# Patient Record
Sex: MALE | Race: WHITE | NOT HISPANIC OR LATINO | Employment: OTHER | URBAN - METROPOLITAN AREA
[De-identification: names, ages, dates, MRNs, and addresses within clinical notes are randomized per-mention and may not be internally consistent; named-entity substitution may affect disease eponyms.]

---

## 2023-03-07 DIAGNOSIS — I15.9 SECONDARY HYPERTENSION: Primary | ICD-10-CM

## 2023-03-07 DIAGNOSIS — I15.9 SECONDARY HYPERTENSION: ICD-10-CM

## 2023-03-07 DIAGNOSIS — E78.5 HYPERLIPIDEMIA, UNSPECIFIED HYPERLIPIDEMIA TYPE: ICD-10-CM

## 2023-03-07 DIAGNOSIS — E78.5 HYPERLIPIDEMIA, UNSPECIFIED HYPERLIPIDEMIA TYPE: Primary | ICD-10-CM

## 2023-03-07 RX ORDER — ATORVASTATIN CALCIUM 80 MG/1
80 TABLET, FILM COATED ORAL DAILY
Qty: 90 TABLET | Refills: 3 | Status: SHIPPED | OUTPATIENT
Start: 2023-03-07 | End: 2023-10-08 | Stop reason: SDUPTHER

## 2023-03-07 RX ORDER — METOPROLOL SUCCINATE 100 MG/1
100 TABLET, EXTENDED RELEASE ORAL DAILY
Qty: 90 TABLET | Refills: 3 | Status: SHIPPED | OUTPATIENT
Start: 2023-03-07 | End: 2023-10-12 | Stop reason: SDUPTHER

## 2023-03-07 RX ORDER — METOPROLOL SUCCINATE 100 MG/1
100 TABLET, EXTENDED RELEASE ORAL DAILY
Qty: 90 TABLET | Refills: 3 | Status: SHIPPED | OUTPATIENT
Start: 2023-03-07 | End: 2023-08-29 | Stop reason: SDUPTHER

## 2023-03-07 RX ORDER — ATORVASTATIN CALCIUM 80 MG/1
1 TABLET, FILM COATED ORAL DAILY
COMMUNITY
End: 2023-03-07 | Stop reason: SDUPTHER

## 2023-03-07 RX ORDER — METOPROLOL SUCCINATE 100 MG/1
100 TABLET, EXTENDED RELEASE ORAL DAILY
COMMUNITY
End: 2023-03-07 | Stop reason: SDUPTHER

## 2023-03-07 RX ORDER — ATORVASTATIN CALCIUM 80 MG/1
80 TABLET, FILM COATED ORAL DAILY
Qty: 90 TABLET | Refills: 3 | Status: SHIPPED | OUTPATIENT
Start: 2023-03-07 | End: 2023-10-12 | Stop reason: SDUPTHER

## 2023-06-28 DIAGNOSIS — I10 ESSENTIAL (PRIMARY) HYPERTENSION: Primary | ICD-10-CM

## 2023-06-29 RX ORDER — LISINOPRIL 20 MG/1
TABLET ORAL
Qty: 90 TABLET | Refills: 3 | Status: SHIPPED | OUTPATIENT
Start: 2023-06-29 | End: 2023-10-12 | Stop reason: SDUPTHER

## 2023-08-29 DIAGNOSIS — I15.9 SECONDARY HYPERTENSION: ICD-10-CM

## 2023-08-29 RX ORDER — METOPROLOL SUCCINATE 100 MG/1
100 TABLET, EXTENDED RELEASE ORAL DAILY
Qty: 90 TABLET | Refills: 3 | Status: SHIPPED | OUTPATIENT
Start: 2023-08-29 | End: 2023-10-08 | Stop reason: SDUPTHER

## 2023-08-30 DIAGNOSIS — E78.5 DYSLIPIDEMIA: ICD-10-CM

## 2023-08-30 PROBLEM — Z85.820 PERSONAL HISTORY OF MALIGNANT MELANOMA OF SKIN: Status: ACTIVE | Noted: 2023-04-25

## 2023-08-30 PROBLEM — I25.10 CAD (CORONARY ARTERY DISEASE): Status: ACTIVE | Noted: 2023-08-30

## 2023-08-30 PROBLEM — Z96.659 S/P TOTAL KNEE ARTHROPLASTY: Status: ACTIVE | Noted: 2023-08-30

## 2023-08-30 PROBLEM — I10 BENIGN ESSENTIAL HYPERTENSION: Status: ACTIVE | Noted: 2023-08-30

## 2023-08-30 PROBLEM — D18.01 HEMANGIOMA OF SKIN AND SUBCUTANEOUS TISSUE: Status: ACTIVE | Noted: 2023-04-25

## 2023-08-30 PROBLEM — L98.9 SKIN LESION OF BACK: Status: ACTIVE | Noted: 2023-08-30

## 2023-08-30 PROBLEM — I73.9 PAD (PERIPHERAL ARTERY DISEASE) (CMS-HCC): Status: ACTIVE | Noted: 2023-08-30

## 2023-08-30 PROBLEM — C43.59 MALIGNANT MELANOMA OF UPPER BACK (MULTI): Status: ACTIVE | Noted: 2023-08-30

## 2023-08-30 PROBLEM — D48.5 NEOPLASM OF UNCERTAIN BEHAVIOR OF SKIN: Status: ACTIVE | Noted: 2023-04-25

## 2023-08-30 PROBLEM — L82.1 OTHER SEBORRHEIC KERATOSIS: Status: ACTIVE | Noted: 2023-04-25

## 2023-08-30 PROBLEM — L57.8 OTHER SKIN CHANGES DUE TO CHRONIC EXPOSURE TO NONIONIZING RADIATION: Status: ACTIVE | Noted: 2023-04-25

## 2023-08-30 PROBLEM — D22.5 MELANOCYTIC NEVI OF TRUNK: Status: ACTIVE | Noted: 2023-04-25

## 2023-08-30 PROBLEM — R53.83 FATIGUE: Status: ACTIVE | Noted: 2023-08-30

## 2023-08-30 RX ORDER — EZETIMIBE 10 MG/1
10 TABLET ORAL NIGHTLY
Qty: 90 TABLET | Refills: 3 | Status: SHIPPED | OUTPATIENT
Start: 2023-08-30 | End: 2023-10-12 | Stop reason: SDUPTHER

## 2023-09-25 DIAGNOSIS — C43.59 MALIGNANT MELANOMA OF UPPER BACK (MULTI): Primary | ICD-10-CM

## 2023-10-08 RX ORDER — VIT C/E/ZN/COPPR/LUTEIN/ZEAXAN 250MG-90MG
1 CAPSULE ORAL DAILY
COMMUNITY
End: 2023-10-12 | Stop reason: ALTCHOICE

## 2023-10-09 ENCOUNTER — LAB (OUTPATIENT)
Dept: LAB | Facility: CLINIC | Age: 73
End: 2023-10-09
Payer: MEDICARE

## 2023-10-09 ENCOUNTER — HOSPITAL ENCOUNTER (OUTPATIENT)
Dept: RADIOLOGY | Facility: HOSPITAL | Age: 73
Discharge: HOME | End: 2023-10-09
Payer: MEDICARE

## 2023-10-09 DIAGNOSIS — C43.59 MALIGNANT MELANOMA OF OTHER PART OF TRUNK (MULTI): ICD-10-CM

## 2023-10-09 DIAGNOSIS — C43.59 MALIGNANT MELANOMA OF UPPER BACK (MULTI): ICD-10-CM

## 2023-10-09 DIAGNOSIS — C43.9 MALIGNANT MELANOMA OF SKIN, UNSPECIFIED (MULTI): ICD-10-CM

## 2023-10-09 LAB
ALBUMIN SERPL BCP-MCNC: 4.5 G/DL (ref 3.4–5)
ALP SERPL-CCNC: 77 U/L (ref 33–136)
ALT SERPL W P-5'-P-CCNC: 29 U/L (ref 10–52)
ANION GAP SERPL CALC-SCNC: 13 MMOL/L (ref 10–20)
AST SERPL W P-5'-P-CCNC: 28 U/L (ref 9–39)
BASOPHILS # BLD AUTO: 0.04 X10*3/UL (ref 0–0.1)
BASOPHILS NFR BLD AUTO: 0.4 %
BILIRUB SERPL-MCNC: 0.8 MG/DL (ref 0–1.2)
BUN SERPL-MCNC: 15 MG/DL (ref 6–23)
CALCIUM SERPL-MCNC: 9.6 MG/DL (ref 8.6–10.3)
CHLORIDE SERPL-SCNC: 101 MMOL/L (ref 98–107)
CO2 SERPL-SCNC: 26 MMOL/L (ref 21–32)
CORTIS AM PEAK SERPL-MSCNC: 11.8 UG/DL (ref 5–20)
CREAT SERPL-MCNC: 0.9 MG/DL (ref 0.5–1.3)
EOSINOPHIL # BLD AUTO: 0.01 X10*3/UL (ref 0–0.4)
EOSINOPHIL NFR BLD AUTO: 0.1 %
ERYTHROCYTE [DISTWIDTH] IN BLOOD BY AUTOMATED COUNT: 13.2 % (ref 11.5–14.5)
GFR SERPL CREATININE-BSD FRML MDRD: 89 ML/MIN/1.73M*2
GLUCOSE SERPL-MCNC: 91 MG/DL (ref 74–99)
HCT VFR BLD AUTO: 48.3 % (ref 41–52)
HGB BLD-MCNC: 16.4 G/DL (ref 13.5–17.5)
IMM GRANULOCYTES # BLD AUTO: 0.01 X10*3/UL (ref 0–0.5)
IMM GRANULOCYTES NFR BLD AUTO: 0.1 % (ref 0–0.9)
LYMPHOCYTES # BLD AUTO: 1.36 X10*3/UL (ref 0.8–3)
LYMPHOCYTES NFR BLD AUTO: 12.8 %
MCH RBC QN AUTO: 33.3 PG (ref 26–34)
MCHC RBC AUTO-ENTMCNC: 34 G/DL (ref 32–36)
MCV RBC AUTO: 98 FL (ref 80–100)
MONOCYTES # BLD AUTO: 1.01 X10*3/UL (ref 0.05–0.8)
MONOCYTES NFR BLD AUTO: 9.5 %
NEUTROPHILS # BLD AUTO: 8.17 X10*3/UL (ref 1.6–5.5)
NEUTROPHILS NFR BLD AUTO: 77.1 %
PLATELET # BLD AUTO: 151 X10*3/UL (ref 150–450)
PMV BLD AUTO: 11.4 FL (ref 7.5–11.5)
POTASSIUM SERPL-SCNC: 4.9 MMOL/L (ref 3.5–5.3)
PROT SERPL-MCNC: 6.8 G/DL (ref 6.4–8.2)
RBC # BLD AUTO: 4.92 X10*6/UL (ref 4.5–5.9)
SODIUM SERPL-SCNC: 135 MMOL/L (ref 136–145)
TSH SERPL-ACNC: 1.96 MIU/L (ref 0.44–3.98)
WBC # BLD AUTO: 10.6 X10*3/UL (ref 4.4–11.3)

## 2023-10-09 PROCEDURE — 84075 ASSAY ALKALINE PHOSPHATASE: CPT

## 2023-10-09 PROCEDURE — 82024 ASSAY OF ACTH: CPT

## 2023-10-09 PROCEDURE — 82533 TOTAL CORTISOL: CPT

## 2023-10-09 PROCEDURE — 84443 ASSAY THYROID STIM HORMONE: CPT

## 2023-10-09 PROCEDURE — 74177 CT ABD & PELVIS W/CONTRAST: CPT

## 2023-10-09 PROCEDURE — 74177 CT ABD & PELVIS W/CONTRAST: CPT | Performed by: SURGERY

## 2023-10-09 PROCEDURE — 2550000001 HC RX 255 CONTRASTS: Performed by: INTERNAL MEDICINE

## 2023-10-09 PROCEDURE — 71260 CT THORAX DX C+: CPT | Performed by: SURGERY

## 2023-10-09 PROCEDURE — 85025 COMPLETE CBC W/AUTO DIFF WBC: CPT

## 2023-10-09 PROCEDURE — 36415 COLL VENOUS BLD VENIPUNCTURE: CPT

## 2023-10-09 PROCEDURE — 82040 ASSAY OF SERUM ALBUMIN: CPT

## 2023-10-09 PROCEDURE — 71260 CT THORAX DX C+: CPT

## 2023-10-09 RX ADMIN — IOHEXOL 75 ML: 350 INJECTION, SOLUTION INTRAVENOUS at 15:32

## 2023-10-11 ENCOUNTER — TELEPHONE (OUTPATIENT)
Dept: ADMISSION | Facility: HOSPITAL | Age: 73
End: 2023-10-11
Payer: MEDICARE

## 2023-10-11 LAB — ACTH PLAS-MCNC: 7.2 PG/ML (ref 7.2–63.3)

## 2023-10-12 ENCOUNTER — INFUSION (OUTPATIENT)
Dept: HEMATOLOGY/ONCOLOGY | Facility: CLINIC | Age: 73
End: 2023-10-12
Payer: MEDICARE

## 2023-10-12 ENCOUNTER — OFFICE VISIT (OUTPATIENT)
Dept: PRIMARY CARE | Facility: CLINIC | Age: 73
End: 2023-10-12
Payer: MEDICARE

## 2023-10-12 ENCOUNTER — OFFICE VISIT (OUTPATIENT)
Dept: HEMATOLOGY/ONCOLOGY | Facility: CLINIC | Age: 73
End: 2023-10-12
Payer: MEDICARE

## 2023-10-12 VITALS
RESPIRATION RATE: 16 BRPM | DIASTOLIC BLOOD PRESSURE: 87 MMHG | OXYGEN SATURATION: 94 % | WEIGHT: 145.28 LBS | BODY MASS INDEX: 24.21 KG/M2 | HEART RATE: 82 BPM | HEIGHT: 65 IN | TEMPERATURE: 97.7 F | SYSTOLIC BLOOD PRESSURE: 145 MMHG

## 2023-10-12 VITALS
SYSTOLIC BLOOD PRESSURE: 130 MMHG | RESPIRATION RATE: 14 BRPM | WEIGHT: 153 LBS | TEMPERATURE: 96.7 F | HEIGHT: 65 IN | BODY MASS INDEX: 25.49 KG/M2 | HEART RATE: 78 BPM | OXYGEN SATURATION: 98 % | DIASTOLIC BLOOD PRESSURE: 70 MMHG

## 2023-10-12 DIAGNOSIS — Z12.5 SCREENING FOR PROSTATE CANCER: ICD-10-CM

## 2023-10-12 DIAGNOSIS — G89.29 CHRONIC PAIN OF LEFT KNEE: Primary | ICD-10-CM

## 2023-10-12 DIAGNOSIS — R52 PAIN: ICD-10-CM

## 2023-10-12 DIAGNOSIS — C43.59 MALIGNANT MELANOMA OF UPPER BACK (MULTI): Primary | ICD-10-CM

## 2023-10-12 DIAGNOSIS — E78.5 DYSLIPIDEMIA: ICD-10-CM

## 2023-10-12 DIAGNOSIS — T73.2XXD FATIGUE DUE TO EXPOSURE, SUBSEQUENT ENCOUNTER: ICD-10-CM

## 2023-10-12 DIAGNOSIS — I73.9 PAD (PERIPHERAL ARTERY DISEASE) (CMS-HCC): ICD-10-CM

## 2023-10-12 DIAGNOSIS — C77.9 MELANOMA METASTATIC TO LYMPH NODE (MULTI): ICD-10-CM

## 2023-10-12 DIAGNOSIS — I10 ESSENTIAL (PRIMARY) HYPERTENSION: ICD-10-CM

## 2023-10-12 DIAGNOSIS — E78.5 HYPERLIPIDEMIA, UNSPECIFIED HYPERLIPIDEMIA TYPE: ICD-10-CM

## 2023-10-12 DIAGNOSIS — M25.562 CHRONIC PAIN OF LEFT KNEE: Primary | ICD-10-CM

## 2023-10-12 DIAGNOSIS — Z51.12 ENCOUNTER FOR ANTINEOPLASTIC IMMUNOTHERAPY: ICD-10-CM

## 2023-10-12 DIAGNOSIS — C43.59 MALIGNANT MELANOMA OF UPPER BACK (MULTI): ICD-10-CM

## 2023-10-12 PROCEDURE — 3075F SYST BP GE 130 - 139MM HG: CPT | Performed by: INTERNAL MEDICINE

## 2023-10-12 PROCEDURE — 99213 OFFICE O/P EST LOW 20 MIN: CPT | Performed by: INTERNAL MEDICINE

## 2023-10-12 PROCEDURE — 3079F DIAST BP 80-89 MM HG: CPT | Performed by: INTERNAL MEDICINE

## 2023-10-12 PROCEDURE — 99215 OFFICE O/P EST HI 40 MIN: CPT | Performed by: INTERNAL MEDICINE

## 2023-10-12 PROCEDURE — 3078F DIAST BP <80 MM HG: CPT | Performed by: INTERNAL MEDICINE

## 2023-10-12 PROCEDURE — 96413 CHEMO IV INFUSION 1 HR: CPT

## 2023-10-12 PROCEDURE — 99215 OFFICE O/P EST HI 40 MIN: CPT | Mod: 25 | Performed by: INTERNAL MEDICINE

## 2023-10-12 PROCEDURE — 1159F MED LIST DOCD IN RCRD: CPT | Performed by: INTERNAL MEDICINE

## 2023-10-12 PROCEDURE — 2500000004 HC RX 250 GENERAL PHARMACY W/ HCPCS (ALT 636 FOR OP/ED): Performed by: INTERNAL MEDICINE

## 2023-10-12 PROCEDURE — 1160F RVW MEDS BY RX/DR IN RCRD: CPT | Performed by: INTERNAL MEDICINE

## 2023-10-12 PROCEDURE — 3077F SYST BP >= 140 MM HG: CPT | Performed by: INTERNAL MEDICINE

## 2023-10-12 RX ORDER — METOPROLOL SUCCINATE 100 MG/1
100 TABLET, EXTENDED RELEASE ORAL DAILY
Qty: 90 TABLET | Refills: 3 | Status: SHIPPED | OUTPATIENT
Start: 2023-10-12 | End: 2024-10-11

## 2023-10-12 RX ORDER — HEPARIN SODIUM,PORCINE/PF 10 UNIT/ML
50 SYRINGE (ML) INTRAVENOUS AS NEEDED
Status: CANCELLED | OUTPATIENT
Start: 2023-10-12

## 2023-10-12 RX ORDER — FAMOTIDINE 10 MG/ML
20 INJECTION INTRAVENOUS ONCE AS NEEDED
Status: CANCELLED | OUTPATIENT
Start: 2023-10-12

## 2023-10-12 RX ORDER — DIPHENHYDRAMINE HYDROCHLORIDE 50 MG/ML
50 INJECTION INTRAMUSCULAR; INTRAVENOUS AS NEEDED
Status: CANCELLED | OUTPATIENT
Start: 2023-10-12

## 2023-10-12 RX ORDER — PROCHLORPERAZINE MALEATE 5 MG
10 TABLET ORAL EVERY 6 HOURS PRN
Status: CANCELLED | OUTPATIENT
Start: 2023-10-12

## 2023-10-12 RX ORDER — METHYLPREDNISOLONE SODIUM SUCCINATE 40 MG/ML
40 INJECTION INTRAMUSCULAR; INTRAVENOUS AS NEEDED
Status: CANCELLED | OUTPATIENT
Start: 2023-10-12

## 2023-10-12 RX ORDER — HEPARIN 100 UNIT/ML
500 SYRINGE INTRAVENOUS AS NEEDED
Status: CANCELLED | OUTPATIENT
Start: 2023-10-12

## 2023-10-12 RX ORDER — DIPHENHYDRAMINE HYDROCHLORIDE 50 MG/ML
50 INJECTION INTRAMUSCULAR; INTRAVENOUS AS NEEDED
Status: DISCONTINUED | OUTPATIENT
Start: 2023-10-12 | End: 2023-10-12 | Stop reason: HOSPADM

## 2023-10-12 RX ORDER — ALBUTEROL SULFATE 0.83 MG/ML
3 SOLUTION RESPIRATORY (INHALATION) AS NEEDED
Status: CANCELLED | OUTPATIENT
Start: 2023-10-12

## 2023-10-12 RX ORDER — ATORVASTATIN CALCIUM 80 MG/1
80 TABLET, FILM COATED ORAL DAILY
Qty: 90 TABLET | Refills: 3 | Status: SHIPPED | OUTPATIENT
Start: 2023-10-12 | End: 2024-10-11

## 2023-10-12 RX ORDER — LISINOPRIL 20 MG/1
20 TABLET ORAL DAILY
Qty: 90 TABLET | Refills: 3 | Status: SHIPPED | OUTPATIENT
Start: 2023-10-12 | End: 2024-10-11

## 2023-10-12 RX ORDER — EPINEPHRINE 0.3 MG/.3ML
0.3 INJECTION SUBCUTANEOUS EVERY 5 MIN PRN
Status: CANCELLED | OUTPATIENT
Start: 2023-10-12

## 2023-10-12 RX ORDER — PROCHLORPERAZINE EDISYLATE 5 MG/ML
10 INJECTION INTRAMUSCULAR; INTRAVENOUS EVERY 6 HOURS PRN
Status: CANCELLED | OUTPATIENT
Start: 2023-10-12

## 2023-10-12 RX ORDER — FAMOTIDINE 10 MG/ML
20 INJECTION INTRAVENOUS ONCE AS NEEDED
Status: DISCONTINUED | OUTPATIENT
Start: 2023-10-12 | End: 2023-10-12 | Stop reason: HOSPADM

## 2023-10-12 RX ORDER — EZETIMIBE 10 MG/1
10 TABLET ORAL NIGHTLY
Qty: 90 TABLET | Refills: 3 | Status: SHIPPED | OUTPATIENT
Start: 2023-10-12

## 2023-10-12 RX ORDER — ALBUTEROL SULFATE 0.83 MG/ML
3 SOLUTION RESPIRATORY (INHALATION) AS NEEDED
Status: DISCONTINUED | OUTPATIENT
Start: 2023-10-12 | End: 2023-10-12 | Stop reason: HOSPADM

## 2023-10-12 RX ORDER — EPINEPHRINE 0.3 MG/.3ML
0.3 INJECTION SUBCUTANEOUS EVERY 5 MIN PRN
Status: DISCONTINUED | OUTPATIENT
Start: 2023-10-12 | End: 2023-10-12 | Stop reason: HOSPADM

## 2023-10-12 RX ADMIN — SODIUM CHLORIDE 400 MG: 9 INJECTION, SOLUTION INTRAVENOUS at 10:55

## 2023-10-12 ASSESSMENT — ENCOUNTER SYMPTOMS
VOMITING: 0
NERVOUS/ANXIOUS: 0
ARTHRALGIAS: 1
SCLERAL ICTERUS: 0
SHORTNESS OF BREATH: 0
OCCASIONAL FEELINGS OF UNSTEADINESS: 0
SORE THROAT: 0
VOICE CHANGE: 0
NECK PAIN: 0
EYE PROBLEMS: 0
FLANK PAIN: 0
LOSS OF SENSATION IN FEET: 0
BACK PAIN: 0
CONSTIPATION: 0
FEVER: 0
SEIZURES: 0
DEPRESSION: 0
DIZZINESS: 0
LIGHT-HEADEDNESS: 0
ADENOPATHY: 0
DIARRHEA: 0
BLOOD IN STOOL: 0
DYSURIA: 0
SHORTNESS OF BREATH: 0
CHILLS: 0
EXTREMITY WEAKNESS: 0
NAUSEA: 0
WHEEZING: 0
ABDOMINAL PAIN: 0
BRUISES/BLEEDS EASILY: 0
TROUBLE SWALLOWING: 0
CONFUSION: 0
NUMBNESS: 0
COUGH: 0
FATIGUE: 0
FREQUENCY: 0
MYALGIAS: 0
HEMATURIA: 0
CONSTIPATION: 0
DIARRHEA: 0
CHEST TIGHTNESS: 0
ARTHRALGIAS: 1
HEMOPTYSIS: 0
PALPITATIONS: 0
HOT FLASHES: 0
DIFFICULTY URINATING: 0
PALPITATIONS: 0
APPETITE CHANGE: 0
LEG SWELLING: 0

## 2023-10-12 NOTE — PROGRESS NOTES
".Patient ID: Gabriel Cowan is a 73 y.o. male.  Referring Physician: No referring provider defined for this encounter.  Primary Care Provider: Montana Hernandez DO     Chief Complaint   Patient presents with    Follow-up    Melanoma     Follow up imaging results and immunotherapy.         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 He met with our team on 4/6/23.      Referring Surgeon: Dr. Kp Kilgore  Dermatologist: Yin Cano DO   Date of first meeting with our team: 04/06/2023     Date of First meeting with surgical team: 02/14/2023  Melanoma type: Cutaneous- Superficial Spreading type  Location of primary site: Left Upper back  Date of WLE and SLNB: 3/6/23  Final pathology: Breslow Depth- 1.6 mm; Ulceration status- Negative; LVI- No; Other high risk features- Microsatelitosis present.  Heppner lymph node status: 2/2 positive. Largest deposit 9mm. No JUAN.  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 WLE and SLNB completed on 3/6/23. He was staged at IIIC melanoma. He started keytruda on 4/27/2023 at 400mg IV dose.        Treatment History:   Systemic therapy.   1. Keytruda 400mg (adjuvant setting)  C1- 04/27/2023  C2- 06/08/2023  C3- 07/20/2023  C4- 08/31/2023  C5- 10/12/2023      Subjective   Please refer to \"Notes/Cancer History\" above for complete History of present illness.     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 He has left knee pain which he experienced suddenly during one of his walks. No fevers. No small joint involvement.      Review of Systems:   Review of Systems   Constitutional:  Negative for appetite change, chills, fatigue and fever.   HENT:   Negative for hearing loss, mouth sores, sore throat, trouble swallowing and voice " change.    Eyes:  Negative for eye problems and icterus.   Respiratory:  Negative for chest tightness, hemoptysis and shortness of breath.    Cardiovascular:  Negative for chest pain, leg swelling and palpitations.   Gastrointestinal:  Negative for abdominal pain, blood in stool, constipation, diarrhea, nausea and vomiting.   Endocrine: Negative for hot flashes.   Genitourinary:  Negative for difficulty urinating, dysuria, frequency, hematuria, nocturia and pelvic pain.    Musculoskeletal:  Positive for arthralgias (left knee pain - likely traumatic). Negative for back pain, flank pain, gait problem, myalgias and neck pain.   Skin:  Negative for itching and rash.   Neurological:  Negative for dizziness, extremity weakness, gait problem, light-headedness, numbness and seizures.   Hematological:  Negative for adenopathy. Does not bruise/bleed easily.   Psychiatric/Behavioral:  Negative for confusion and depression. The patient is not nervous/anxious.          MEDICAL HISTORY  Past Medical History:   Diagnosis Date    CAD (coronary artery disease)     HTN (hypertension)     Malignant melanoma of skin of back (CMS/HCC)     Melanoma metastatic to lymph node (CMS/HCC)        FAMILY HISTORY  Family History   Problem Relation Name Age of Onset    No Known Problems Mother      No Known Problems Father      No Known Problems Sister      No Known Problems Brother         TOBACCO HISTORY  Tobacco Use: High Risk (10/12/2023)    Patient History     Smoking Tobacco Use: Some Days     Smokeless Tobacco Use: Never     Passive Exposure: Not on file       SOCIAL HISTORY  Social Connections: Not on file        Outpatient Medication Profile:  Current Outpatient Medications on File Prior to Visit   Medication Sig Dispense Refill    aspirin-calcium carbonate 81 mg-300 mg calcium(777 mg) tablet Take 1 tablet by mouth once daily. 90 each 3    atorvastatin (Lipitor) 80 mg tablet Take 1 tablet (80 mg) by mouth once daily. 90 tablet 3     "cholecalciferol (Vitamin D-3) 25 MCG (1000 UT) capsule Take 1 capsule (25 mcg) by mouth once daily.      ezetimibe (Zetia) 10 mg tablet TAKE 1 TABLET BY MOUTH AT  BEDTIME 90 tablet 3    lisinopril 20 mg tablet TAKE 1 TABLET BY MOUTH  DAILY 90 tablet 3    metoprolol succinate XL (Toprol-XL) 100 mg 24 hr tablet Take 1 tablet (100 mg) by mouth once daily. Do not crush or chew. 90 tablet 3    [DISCONTINUED] aspirin-calcium carbonate 81 mg-300 mg calcium(777 mg) tablet Take 1 tablet by mouth once daily. 90 each 3    [DISCONTINUED] atorvastatin (Lipitor) 80 mg tablet Take 1 tablet (80 mg) by mouth once daily. 90 tablet 3    [DISCONTINUED] metoprolol succinate XL (Toprol-XL) 100 mg 24 hr tablet Take 1 tablet (100 mg) by mouth once daily. Do not crush or chew. 90 tablet 3     No current facility-administered medications on file prior to visit.         Performance Status:  Asymptomatic     Vitals and Measurements:   /87 (BP Location: Left arm, Patient Position: Sitting)   Pulse 82   Temp 36.5 °C (97.7 °F) (Temporal)   Resp 16   Ht (S) 1.643 m (5' 4.69\") Comment: height verification  Wt 65.9 kg (145 lb 4.5 oz)   SpO2 94%   BMI 24.41 kg/m²       Physical Exam:   Physical Exam  Constitutional:       General: He is not in acute distress.     Appearance: Normal appearance. He is not ill-appearing.   HENT:      Head: Normocephalic and atraumatic.   Eyes:      Extraocular Movements: Extraocular movements intact.      Conjunctiva/sclera: Conjunctivae normal.      Pupils: Pupils are equal, round, and reactive to light.   Cardiovascular:      Rate and Rhythm: Normal rate and regular rhythm.      Pulses: Normal pulses.   Pulmonary:      Effort: Pulmonary effort is normal.      Breath sounds: No wheezing, rhonchi or rales.   Abdominal:      General: Abdomen is flat. Bowel sounds are normal.      Palpations: There is no mass.      Tenderness: There is no abdominal tenderness. There is no rebound.   Musculoskeletal:         " General: Swelling (left knee) present. Normal range of motion.      Cervical back: Neck supple.   Skin:     General: Skin is warm.   Neurological:      General: No focal deficit present.      Mental Status: He is alert and oriented to person, place, and time.   Psychiatric:         Mood and Affect: Mood normal.         Behavior: Behavior normal.         Thought Content: Thought content normal.         Judgment: Judgment normal.              Lab Results:  I have reviewed these laboratory results:     Lab Results   Component Value Date    WBC 10.6 10/09/2023    HGB 16.4 10/09/2023    HCT 48.3 10/09/2023    MCV 98 10/09/2023     10/09/2023         Chemistry    Lab Results   Component Value Date/Time     (L) 10/09/2023 1526    K 4.9 10/09/2023 1526     10/09/2023 1526    CO2 26 10/09/2023 1526    BUN 15 10/09/2023 1526    CREATININE 0.90 10/09/2023 1526    Lab Results   Component Value Date/Time    CALCIUM 9.6 10/09/2023 1526    ALKPHOS 77 10/09/2023 1526    AST 28 10/09/2023 1526    ALT 29 10/09/2023 1526    BILITOT 0.8 10/09/2023 1526            Lab Results   Component Value Date    TSH 1.96 10/09/2023        Radiology Result:  I have reviewed the latest Imaging in PACS and the findings are noted in this note. I discussed the results of the latest imaging with the patient. All previous imaging were reviewed at the time it was completed. Full records are available in the EMR for review as well.     === 10/09/23 ===    CT CHEST ABDOMEN PELVIS W IV CONTRAST    - Impression -  No evidence of acute abnormality or metastatic disease in the chest,  abdomen or pelvis.    Detailed discussion of incidental findings as above.      MACRO:  None    Signed by: Jairo Jennings 10/11/2023 1:08 PM  Dictation workstation:   [id number]    === 04/04/23 ===    MR BRAIN W AND WO CONTRAST    - Impression -  No evidence of intracranial metastatic disease.    Focal hyperintense signal with enhancement within the right  frontal  calvarium may represent small hemangiomas versus a prominent venous  [place]. Consider noncontrast CT of the head to further characterize  and attention on follow-up exams.    Mild degree of nonspecific white matter signal compatible with  microangiopathy.    CT chest abdomen pelvis w IV contrast    Result Date: 10/11/2023  Impression: No evidence of acute abnormality or metastatic disease in the chest, abdomen or pelvis.   Detailed discussion of incidental findings as above.     MACRO: None   Signed by: Jairo Jennings 10/11/2023 1:08 PM Dictation workstation:   [id number]        Pathology Results:  I have reviewed the full pathology report recorded in the EMR. The pertinent portions indicating diagnosis are listed here in the note. for details please refer to the full report recorded in the EMR.    Dermatopathology [Jan 26 2023 4:25PM] ([id number])    Specimens: SKIN, L LATERAL BACK     Name JUNIE ROSA     Accession  #: [id number]   Pathologist: KENY LOPEZ MD   Date ofProcedure: 1/24/2023   Date Received: 1/25/2023   Date Reported 1/26/2023   Submitting Physician: BRANDO MOORE DO   Location: [facility] Other External #     FINAL  DIAGNOSIS   SKIN, L LATERAL BACK, SHAVE BIOPSY:   INVASIVE MALIGNANT MELANOMA, EXTENDING TO A DEPTH OF APPROXIMATELY 1.6 MM (SEE COMMENT):     Comment: The broad shave specimen is sectioned into five portions and reveals a broad severely  atypical compound melanocytic proliferation with areas of tumor regression. The invasive component extends focally to the deep margin at a peripheral margin. See synoptic summary below.     Electronically Signed Out by KENY LOPEZ MD.     Addendum Diagnosis   TEST: BRAF Exon 15 Sequencing   SPECIMEN: FFPE, SKIN LEFT UPPER BACK, [id number] C10   DISEASE DIAGNOSIS: Melanoma   Estimated Tumor Content: 40%   COLLECTION  DATE: 3/6/2023   RECEIVED DATE: 3/20/2023   REPORT DATE: 03/24/2023    RESULT:   BRAF p.V600E ([id number])       INTERPRETATION AND POTENTIAL THERAPEUTIC OPTIONS:   BRAF p.V600E   BRAF V600E mutations are associated with sensitivity to BRAF and MEK   inhibitors.      Assessment and Plan:   Assessment/Plan      Mr. Gabriel Cowan is a 73 y.o. male with a diagnosis of melanoma IIIC. BRAF positive. Please see the evolution of the case listed above in the cancer history.     Today,   Mr. Cowan continues to do well. No new issues reported this visit. CT scan is unremarkable. Continue with therapy and surveillance.      # Melanoma IIIC  - Continue Keytruda at 400mg IV. Cycle 5 today.   - RTC in 6 weeks on 11/30/2023.  - Labs on 11/27/23.  - Continue bobby basin u/s with surgical oncology. Next 10/17/2023.  - Continue skin checks with dermatology      # CAD/HTN  - Follows with cardiology. Next visit 11/07/2023.       DISCLAIMER:   In preparing for this visit and writing this note, I reviewed all the previous electronic medical records (labs, imaging and medical charts) of the patient available in the physician portal. Significant findings which helped in decision making are recorded  in this chart.     The plan was discussed with the patient. We gave him ample opportunities to ask questions. All questions were answered to his satisfaction and he verbalized understanding.       Christopher Iglesias MD    Hematology and Oncology     Phone: [phone]  Fax: [phone].

## 2023-10-12 NOTE — PROGRESS NOTES
"Subjective   Patient ID: Gabriel Cowan is a 73 y.o. male who presents for Knee Pain (Knee pain left sided.).    Knee Pain     Left knee pain.  No acute injury, but he does admit he remembers twisting it slightly.  Discomfort along the lateral and inferior border of his left knee.  No swelling.  No bony pain.       Review of Systems   Respiratory:  Negative for cough, shortness of breath and wheezing.    Cardiovascular:  Negative for chest pain and palpitations.   Gastrointestinal:  Negative for constipation and diarrhea.   Musculoskeletal:  Positive for arthralgias.         Left knee pain       Objective   /70 (BP Location: Left arm, Patient Position: Sitting, BP Cuff Size: Adult)   Pulse 78   Temp 35.9 °C (96.7 °F) (Tympanic)   Resp 14   Ht 1.648 m (5' 4.9\")   Wt 69.4 kg (153 lb)   SpO2 98%   BMI 25.54 kg/m²     Physical Exam  Vitals reviewed.   Constitutional:       Appearance: Normal appearance.   HENT:      Head: Normocephalic.   Cardiovascular:      Rate and Rhythm: Normal rate.   Pulmonary:      Effort: Pulmonary effort is normal.   Musculoskeletal:         General: Tenderness present. No swelling. Normal range of motion.      Comments: Some mild tenderness lateral compartment of left knee.  Full ROM.  No joint effusion.  + antalgic gait.    Neurological:      General: No focal deficit present.      Mental Status: He is alert.   Psychiatric:         Mood and Affect: Mood normal.         Assessment/Plan   Problem List Items Addressed This Visit             ICD-10-CM    Dyslipidemia E78.5    Relevant Medications    ezetimibe (Zetia) 10 mg tablet    PAD (peripheral artery disease) (CMS/HCC) I73.9     Other Visit Diagnoses         Codes    Chronic pain of left knee    -  Primary M25.562, G89.29    Relevant Orders    Referral to Physical Therapy    Hyperlipidemia, unspecified hyperlipidemia type     E78.5    Relevant Medications    atorvastatin (Lipitor) 80 mg tablet    metoprolol succinate XL " (Toprol-XL) 100 mg 24 hr tablet    Other Relevant Orders    Lipid Panel    Essential (primary) hypertension     I10    Relevant Medications    lisinopril 20 mg tablet    Screening for prostate cancer     Z12.5    Relevant Orders    Prostate Specific Antigen    Pain     R52    Relevant Orders    XR knee left 3 views        Discussed all of the above.    For his left knee pain we will get x-ray and proceed with PT.  We also discussed using OTC Voltaren gel to help with inflammation and pain.  If symptoms persist he may need to see ortho.  We discussed his risk factors as well.    Follow up in a few months - sooner if any issues.

## 2023-10-12 NOTE — PATIENT INSTRUCTIONS
Mr. Gabriel Cowan ,  It was a pleasure talking to you today.    As discussed, this is the plan for you.   Next Keytruda is on 11/30/23  Labs on 11/28/23  Visit with us on 11/30/23 for pre-treatment check    Our offices will be reaching out to you to make all the appointments. I am always available at the phone numbers listed below for an earlier appointment should you wish one.    In case of an emergency please dial 911 or report to your nearest Emergency Room.  For all other questions, please do not hesitate to reach out to us at the number listed below.    Thank you for choosing [facility] at [facility].   We appreciate your visit.     MD Brisa Landeros, DEMETRIO Mccormick RN    Sarcoma and Cutaneous Oncology team    Phone: [phone]  Fax: [phone].

## 2023-10-12 NOTE — SIGNIFICANT EVENT
10/12/23 1021   Prechemo Checklist   Has the patient been in the hospital, ED, or urgent care since last date of service No   Chemo/Immuno Consent Signed Yes   Protocol/Indications Verified Yes   Confirmed to previous date/time of medication Yes   Compared to previous dose Yes   All medications are dated accurately Yes   Pregnancy Test Negative Not applicable   Parameters Met Yes   BSA/Weight-Height Verified Yes   Dose Calculations Verified Yes

## 2023-10-17 ENCOUNTER — HOSPITAL ENCOUNTER (OUTPATIENT)
Dept: RADIOLOGY | Facility: HOSPITAL | Age: 73
Discharge: HOME | End: 2023-10-17
Payer: MEDICARE

## 2023-10-17 DIAGNOSIS — C43.59 MALIGNANT MELANOMA OF OTHER PART OF TRUNK (MULTI): ICD-10-CM

## 2023-10-17 PROCEDURE — 76882 US LMTD JT/FCL EVL NVASC XTR: CPT | Performed by: RADIOLOGY

## 2023-10-17 PROCEDURE — 76882 US LMTD JT/FCL EVL NVASC XTR: CPT

## 2023-10-19 DIAGNOSIS — C43.59 MALIGNANT MELANOMA OF UPPER BACK (MULTI): Primary | ICD-10-CM

## 2023-10-23 ENCOUNTER — TELEPHONE (OUTPATIENT)
Dept: ADMISSION | Facility: HOSPITAL | Age: 73
End: 2023-10-23
Payer: MEDICARE

## 2023-10-24 ENCOUNTER — OFFICE VISIT (OUTPATIENT)
Dept: PRIMARY CARE | Facility: CLINIC | Age: 73
End: 2023-10-24
Payer: MEDICARE

## 2023-10-24 ENCOUNTER — ANCILLARY PROCEDURE (OUTPATIENT)
Dept: RADIOLOGY | Facility: CLINIC | Age: 73
End: 2023-10-24
Payer: MEDICARE

## 2023-10-24 VITALS
OXYGEN SATURATION: 98 % | DIASTOLIC BLOOD PRESSURE: 80 MMHG | BODY MASS INDEX: 25.33 KG/M2 | WEIGHT: 152 LBS | HEIGHT: 65 IN | HEART RATE: 68 BPM | SYSTOLIC BLOOD PRESSURE: 130 MMHG | RESPIRATION RATE: 14 BRPM | TEMPERATURE: 97 F

## 2023-10-24 DIAGNOSIS — E78.5 DYSLIPIDEMIA: ICD-10-CM

## 2023-10-24 DIAGNOSIS — I25.10 CORONARY ARTERY DISEASE INVOLVING NATIVE CORONARY ARTERY OF NATIVE HEART WITHOUT ANGINA PECTORIS: ICD-10-CM

## 2023-10-24 DIAGNOSIS — Z00.00 WELLNESS EXAMINATION: ICD-10-CM

## 2023-10-24 DIAGNOSIS — I73.9 PAD (PERIPHERAL ARTERY DISEASE) (CMS-HCC): Primary | ICD-10-CM

## 2023-10-24 DIAGNOSIS — R52 PAIN: ICD-10-CM

## 2023-10-24 DIAGNOSIS — I10 BENIGN ESSENTIAL HYPERTENSION: ICD-10-CM

## 2023-10-24 DIAGNOSIS — Z00.00 PERIODIC HEALTH ASSESSMENT, GENERAL SCREENING, ADULT: ICD-10-CM

## 2023-10-24 PROCEDURE — 73562 X-RAY EXAM OF KNEE 3: CPT | Mod: LEFT SIDE | Performed by: RADIOLOGY

## 2023-10-24 PROCEDURE — G0439 PPPS, SUBSEQ VISIT: HCPCS | Performed by: INTERNAL MEDICINE

## 2023-10-24 PROCEDURE — 1160F RVW MEDS BY RX/DR IN RCRD: CPT | Performed by: INTERNAL MEDICINE

## 2023-10-24 PROCEDURE — 1159F MED LIST DOCD IN RCRD: CPT | Performed by: INTERNAL MEDICINE

## 2023-10-24 PROCEDURE — 99397 PER PM REEVAL EST PAT 65+ YR: CPT | Performed by: INTERNAL MEDICINE

## 2023-10-24 PROCEDURE — G0008 ADMIN INFLUENZA VIRUS VAC: HCPCS | Performed by: INTERNAL MEDICINE

## 2023-10-24 PROCEDURE — 3075F SYST BP GE 130 - 139MM HG: CPT | Performed by: INTERNAL MEDICINE

## 2023-10-24 PROCEDURE — 90662 IIV NO PRSV INCREASED AG IM: CPT | Performed by: INTERNAL MEDICINE

## 2023-10-24 PROCEDURE — 1170F FXNL STATUS ASSESSED: CPT | Performed by: INTERNAL MEDICINE

## 2023-10-24 PROCEDURE — 3079F DIAST BP 80-89 MM HG: CPT | Performed by: INTERNAL MEDICINE

## 2023-10-24 PROCEDURE — 73562 X-RAY EXAM OF KNEE 3: CPT | Mod: LT

## 2023-10-24 ASSESSMENT — PATIENT HEALTH QUESTIONNAIRE - PHQ9
1. LITTLE INTEREST OR PLEASURE IN DOING THINGS: NOT AT ALL
2. FEELING DOWN, DEPRESSED OR HOPELESS: NOT AT ALL
SUM OF ALL RESPONSES TO PHQ9 QUESTIONS 1 AND 2: 0

## 2023-10-24 ASSESSMENT — ENCOUNTER SYMPTOMS
PALPITATIONS: 0
SHORTNESS OF BREATH: 0
RHINORRHEA: 1
ABDOMINAL PAIN: 0
WHEEZING: 0
COUGH: 1

## 2023-10-24 ASSESSMENT — ACTIVITIES OF DAILY LIVING (ADL)
DOING_HOUSEWORK: INDEPENDENT
BATHING: INDEPENDENT
GROCERY_SHOPPING: INDEPENDENT
MANAGING_FINANCES: INDEPENDENT
TAKING_MEDICATION: INDEPENDENT
DRESSING: INDEPENDENT

## 2023-10-24 NOTE — PROGRESS NOTES
" Subjective   Patient ID: Gabriel Cowan is a 73 y.o. male who presents for [facility] and  Medicare Annual Wellness Visit Subsequent.    Overall doing well.  Recent URI with mild symptoms.  No F/C/S.  No SOB.  Patient is fairly active, although limited recently due to knee pain.  Denies any issues with CP,SOB or dizzy spells.  No issues with anxiety, depression or sleep related problems. Denies any issues with HA, numbness or tingling.  No issues or changes with bowel or bladder habits.      Review of Systems   HENT:  Positive for congestion, postnasal drip and rhinorrhea.    Respiratory:  Positive for cough. Negative for shortness of breath and wheezing.    Cardiovascular:  Negative for chest pain and palpitations.   Gastrointestinal:  Negative for abdominal pain.   ROS is otherwise unremarkable.       Objective   /80 (BP Location: Left arm, Patient Position: Sitting, BP Cuff Size: Adult)   Pulse 68   Temp 36.1 °C (97 °F) (Tympanic)   Resp 14   Ht 1.651 m (5' 5\")   Wt 68.9 kg (152 lb)   SpO2 98%   BMI 25.29 kg/m²     Physical Exam  Constitutional:       General: He is not in acute distress.     Appearance: Normal appearance. He is not ill-appearing.   HENT:      Head: Normocephalic and atraumatic.      Nose: Nose normal.   Eyes:      Extraocular Movements: Extraocular movements intact.      Conjunctiva/sclera: Conjunctivae normal.      Pupils: Pupils are equal, round, and reactive to light.   Cardiovascular:      Rate and Rhythm: Normal rate and regular rhythm.   Pulmonary:      Effort: Pulmonary effort is normal.      Breath sounds: Normal breath sounds.   Abdominal:      General: There is no distension.   Musculoskeletal:         General: Normal range of motion.      Cervical back: Neck supple.   Neurological:      General: No focal deficit present.      Mental Status: He is alert.      Gait: Gait normal.   Psychiatric:         Mood and Affect: Mood normal.         Behavior: Behavior normal. "         Assessment/Plan   Problem List Items Addressed This Visit             ICD-10-CM    Benign essential hypertension I10    CAD (coronary artery disease) I25.10    Dyslipidemia E78.5    PAD (peripheral artery disease) (CMS/[facility]) - Primary I73.9     Other Visit Diagnoses         Codes    Wellness examination     Z00.00    Periodic health assessment, general screening, adult     Z00.00    Relevant Orders    Colonoscopy Screening; Average Risk Patient        Physical exam is unremarkable.  We reviewed and discussed all the above.  We discussed current medications as well as most recent test results.  He has knee xray this morning.  We pulled up the images.  Bones are intact.  Some subchondral sclerosis and narrowing of medial joint space consistent with OA.  Discussed topical voltaren and PT.    We also discussed the importance and benefits of a healthy diet that is both low in sugars and low in saturated fats.  We reviewed and discussed the benefits of regular physical exercise especially when at or above a level of 150 minutes/week.  We also discussed the importance of stress management and good sleep hygiene.  Annual Wellness Visit questions and answers were reviewed and discussed including the importance of discussing end of life wishes as well as having a living will and health care power of .     We will continue to work on lifestyle improvements and follow-up in 6 to 12 months, sooner if any issues should arise.

## 2023-11-07 ENCOUNTER — OFFICE VISIT (OUTPATIENT)
Dept: CARDIOLOGY | Facility: CLINIC | Age: 73
End: 2023-11-07
Payer: MEDICARE

## 2023-11-07 VITALS
HEART RATE: 78 BPM | WEIGHT: 152 LBS | BODY MASS INDEX: 25.95 KG/M2 | DIASTOLIC BLOOD PRESSURE: 78 MMHG | HEIGHT: 64 IN | SYSTOLIC BLOOD PRESSURE: 128 MMHG | OXYGEN SATURATION: 99 %

## 2023-11-07 DIAGNOSIS — I10 BENIGN ESSENTIAL HYPERTENSION: ICD-10-CM

## 2023-11-07 DIAGNOSIS — E78.5 DYSLIPIDEMIA: ICD-10-CM

## 2023-11-07 DIAGNOSIS — I25.10 CORONARY ARTERY DISEASE INVOLVING NATIVE CORONARY ARTERY OF NATIVE HEART WITHOUT ANGINA PECTORIS: Primary | ICD-10-CM

## 2023-11-07 PROCEDURE — 1160F RVW MEDS BY RX/DR IN RCRD: CPT | Performed by: INTERNAL MEDICINE

## 2023-11-07 PROCEDURE — 1126F AMNT PAIN NOTED NONE PRSNT: CPT | Performed by: INTERNAL MEDICINE

## 2023-11-07 PROCEDURE — 99214 OFFICE O/P EST MOD 30 MIN: CPT | Performed by: INTERNAL MEDICINE

## 2023-11-07 PROCEDURE — 3078F DIAST BP <80 MM HG: CPT | Performed by: INTERNAL MEDICINE

## 2023-11-07 PROCEDURE — 3074F SYST BP LT 130 MM HG: CPT | Performed by: INTERNAL MEDICINE

## 2023-11-07 PROCEDURE — 1159F MED LIST DOCD IN RCRD: CPT | Performed by: INTERNAL MEDICINE

## 2023-11-07 ASSESSMENT — ENCOUNTER SYMPTOMS
DEPRESSION: 0
LOSS OF SENSATION IN FEET: 0
OCCASIONAL FEELINGS OF UNSTEADINESS: 0

## 2023-11-07 ASSESSMENT — PAIN SCALES - GENERAL: PAINLEVEL: 0-NO PAIN

## 2023-11-07 NOTE — PROGRESS NOTES
Chief Complaint:   No chief complaint on file.     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     Moved to [place] to be closer to his son and daughter-in-law. He had most recently lived in the [place] area but previously had lived in [place] as well as [place].     First PCI to the LAD 2010 in [place]. About a year or so later had a stress test leading to a second PCI (believes that there was was in the inferolateral distribution). Moved to [facility]. Follow-up with the primary care physician who was checking blood work including lipid panel routinely. Says that his LDL was pretty well controlled with atorvastatin and ezetimibe.     Denies any exertional chest pain or shortness of breath. Has no lower extremity swelling, lightheadedness or palpitations.      Was diagnosed with metastatic melanoma (stage IIIc).  Started on immunotherapy (Keytruda).    PCI to LCx 3/9/2015: Promus Premier 4 x 12 mm LISSA    PCI of LAD 2010 for anterior wall MI.  Haswell 3 mm stent to the mid LAD    Left iliac stent 2009     Past Medical History:  He has a past medical history of CAD (coronary artery disease), HTN (hypertension), Malignant melanoma of skin of back (CMS/HCC), and Melanoma metastatic to lymph node (CMS/HCC).    Past Surgical History:  He has a past surgical history that includes Other surgical history (07/11/2022); Other surgical history (07/11/2022); Other surgical history (07/11/2022); and Other surgical history (07/11/2022).      Social History:  He reports that he has been smoking cigars. He has never used smokeless tobacco. He reports current alcohol use of about 4.0 standard drinks of alcohol per week. He reports that he does not use drugs.    Family History:  Family History   Problem Relation Name Age of Onset    No Known Problems Mother      No Known Problems Father    "   No Known Problems Sister      No Known Problems Brother          Allergies:  Patient has no known allergies.    Outpatient Medications:  Current Outpatient Medications   Medication Instructions    aspirin-calcium carbonate 81 mg-300 mg calcium(777 mg) tablet 1 tablet, oral, Daily    atorvastatin (LIPITOR) 80 mg, oral, Daily    ezetimibe (ZETIA) 10 mg, oral, Nightly    lisinopril 20 mg, oral, Daily    metoprolol succinate XL (TOPROL-XL) 100 mg, oral, Daily, Do not crush or chew.    pembrolizumab (Keytruda) 25 mg/mL chemo injection intravenous, Once       Last Recorded Vitals:  Visit Vitals  /78 (BP Location: Right arm, Patient Position: Sitting)   Pulse 78   Ht 1.626 m (5' 4\")   Wt 68.9 kg (152 lb)   SpO2 99%   BMI 26.09 kg/m²   Smoking Status Some Days   BSA 1.76 m²      LASTWT(3):   Wt Readings from Last 3 Encounters:   11/07/23 68.9 kg (152 lb)   10/24/23 68.9 kg (152 lb)   10/12/23 69.4 kg (153 lb)       Physical Exam:  In general: alert and in no acute distress.   HEENT: Mucous membranes moist, carotid upstrokes normal with no bruits. JVP is normal. No cervical lymphadenopathy. Cranial nerves II-XII grossly intact.  Pulmonary: Clear to auscultation bilaterally.  Cardiovascular: S1,S2, regular. No appreciable murmurs, rubs or gallops.   Lower extremities: Warm. 2+ distal pulses. No edema.  Skin: warm and dry. No obvious rashes visualized.  Psychiatric: Oriented to person, place and time. No obvious agitation.     Last Labs:  CBC -  Recent Labs     10/09/23  1526 08/29/23  0822 07/17/23  1102   WBC 10.6 7.3 9.2   HGB 16.4 15.7 15.5   HCT 48.3 47.3 44.8    158 142*   MCV 98 98 95       CMP -  Recent Labs     10/09/23  1526 08/29/23  0822 07/17/23  1102   * 139 133*   K 4.9 5.2 4.5    106 102   CO2 26 28 26   ANIONGAP 13 10 10   BUN 15 17 18   CREATININE 0.90 0.93 0.90   EGFR 89  --   --      Recent Labs     10/09/23  1526 08/29/23  0822 07/17/23  1102   ALBUMIN 4.5 4.3 4.1   ALKPHOS 77 " "66 67   ALT 29 29 27   AST 28 24 26   BILITOT 0.8 0.7 0.7       LIPID PANEL -   Recent Labs     08/17/22  1013   CHOL 110   LDLF 52   HDL 44.0   TRIG 69       No results for input(s): \"BNP\", \"HGBA1C\" in the last 91674 hours.        Assessment/Plan   1) CAD: PCI to the LAD and LCx in the past.  Once again stable from a symptom standpoint.  Based on the ISCHEMIA Trial no need for routine stress testing.  We will order an echocardiogram.  I have also requested records.     2) dyslipidemia: LDL at goal.  No changes needed.     3) hypertension: Blood pressure well controlled.     4) follow-up: 1 year or sooner if needed.       Figueroa Perera MD  "

## 2023-11-28 ENCOUNTER — LAB (OUTPATIENT)
Dept: LAB | Facility: CLINIC | Age: 73
End: 2023-11-28
Payer: MEDICARE

## 2023-11-28 DIAGNOSIS — T73.2XXD FATIGUE DUE TO EXPOSURE, SUBSEQUENT ENCOUNTER: ICD-10-CM

## 2023-11-28 DIAGNOSIS — Z51.12 ENCOUNTER FOR ANTINEOPLASTIC IMMUNOTHERAPY: ICD-10-CM

## 2023-11-28 DIAGNOSIS — C77.9 MELANOMA METASTATIC TO LYMPH NODE (MULTI): ICD-10-CM

## 2023-11-28 DIAGNOSIS — C43.59 MALIGNANT MELANOMA OF UPPER BACK (MULTI): ICD-10-CM

## 2023-11-28 DIAGNOSIS — E78.5 HYPERLIPIDEMIA, UNSPECIFIED HYPERLIPIDEMIA TYPE: ICD-10-CM

## 2023-11-28 LAB
ALBUMIN SERPL BCP-MCNC: 4.2 G/DL (ref 3.4–5)
ALP SERPL-CCNC: 84 U/L (ref 33–136)
ALT SERPL W P-5'-P-CCNC: 30 U/L (ref 10–52)
ANION GAP SERPL CALC-SCNC: 10 MMOL/L (ref 10–20)
AST SERPL W P-5'-P-CCNC: 25 U/L (ref 9–39)
BASOPHILS # BLD AUTO: 0.04 X10*3/UL (ref 0–0.1)
BASOPHILS NFR BLD AUTO: 0.5 %
BILIRUB SERPL-MCNC: 0.3 MG/DL (ref 0–1.2)
BUN SERPL-MCNC: 16 MG/DL (ref 6–23)
CALCIUM SERPL-MCNC: 9.2 MG/DL (ref 8.6–10.3)
CHLORIDE SERPL-SCNC: 108 MMOL/L (ref 98–107)
CHOLEST SERPL-MCNC: 103 MG/DL (ref 0–199)
CHOLESTEROL/HDL RATIO: 2.5
CO2 SERPL-SCNC: 28 MMOL/L (ref 21–32)
CORTIS AM PEAK SERPL-MSCNC: 6.4 UG/DL (ref 5–20)
CREAT SERPL-MCNC: 0.82 MG/DL (ref 0.5–1.3)
EOSINOPHIL # BLD AUTO: 0.13 X10*3/UL (ref 0–0.4)
EOSINOPHIL NFR BLD AUTO: 1.7 %
ERYTHROCYTE [DISTWIDTH] IN BLOOD BY AUTOMATED COUNT: 13.3 % (ref 11.5–14.5)
GFR SERPL CREATININE-BSD FRML MDRD: >90 ML/MIN/1.73M*2
GGT SERPL-CCNC: 14 U/L (ref 5–64)
GLUCOSE SERPL-MCNC: 110 MG/DL (ref 74–99)
HCT VFR BLD AUTO: 46 % (ref 41–52)
HDLC SERPL-MCNC: 41.5 MG/DL
HGB BLD-MCNC: 14.9 G/DL (ref 13.5–17.5)
IMM GRANULOCYTES # BLD AUTO: 0.02 X10*3/UL (ref 0–0.5)
IMM GRANULOCYTES NFR BLD AUTO: 0.3 % (ref 0–0.9)
LDH SERPL L TO P-CCNC: 143 U/L (ref 84–246)
LDLC SERPL CALC-MCNC: 44 MG/DL
LYMPHOCYTES # BLD AUTO: 1.97 X10*3/UL (ref 0.8–3)
LYMPHOCYTES NFR BLD AUTO: 25.1 %
MCH RBC QN AUTO: 32.5 PG (ref 26–34)
MCHC RBC AUTO-ENTMCNC: 32.4 G/DL (ref 32–36)
MCV RBC AUTO: 100 FL (ref 80–100)
MONOCYTES # BLD AUTO: 0.76 X10*3/UL (ref 0.05–0.8)
MONOCYTES NFR BLD AUTO: 9.7 %
NEUTROPHILS # BLD AUTO: 4.93 X10*3/UL (ref 1.6–5.5)
NEUTROPHILS NFR BLD AUTO: 62.7 %
NON HDL CHOLESTEROL: 62 MG/DL (ref 0–149)
PLATELET # BLD AUTO: 155 X10*3/UL (ref 150–450)
POTASSIUM SERPL-SCNC: 4.5 MMOL/L (ref 3.5–5.3)
PROT SERPL-MCNC: 6.4 G/DL (ref 6.4–8.2)
RBC # BLD AUTO: 4.59 X10*6/UL (ref 4.5–5.9)
SODIUM SERPL-SCNC: 141 MMOL/L (ref 136–145)
TRIGL SERPL-MCNC: 90 MG/DL (ref 0–149)
TSH SERPL-ACNC: 1.31 MIU/L (ref 0.44–3.98)
VLDL: 18 MG/DL (ref 0–40)
WBC # BLD AUTO: 7.9 X10*3/UL (ref 4.4–11.3)

## 2023-11-28 PROCEDURE — 80053 COMPREHEN METABOLIC PANEL: CPT

## 2023-11-28 PROCEDURE — 80061 LIPID PANEL: CPT

## 2023-11-28 PROCEDURE — 84443 ASSAY THYROID STIM HORMONE: CPT

## 2023-11-28 PROCEDURE — 82977 ASSAY OF GGT: CPT

## 2023-11-28 PROCEDURE — 82533 TOTAL CORTISOL: CPT

## 2023-11-28 PROCEDURE — 36415 COLL VENOUS BLD VENIPUNCTURE: CPT

## 2023-11-28 PROCEDURE — 85025 COMPLETE CBC W/AUTO DIFF WBC: CPT

## 2023-11-28 PROCEDURE — 83615 LACTATE (LD) (LDH) ENZYME: CPT

## 2023-11-29 PROBLEM — E78.5 HYPERLIPIDEMIA: Status: ACTIVE | Noted: 2023-11-29

## 2023-11-29 PROBLEM — K63.5 COLON POLYPS: Status: ACTIVE | Noted: 2017-04-06

## 2023-11-29 PROBLEM — I10 HYPERTENSION: Status: ACTIVE | Noted: 2023-11-29

## 2023-11-29 PROBLEM — F17.200 TOBACCO DEPENDENCE: Status: ACTIVE | Noted: 2020-12-08

## 2023-11-29 PROBLEM — R73.01 IFG (IMPAIRED FASTING GLUCOSE): Status: ACTIVE | Noted: 2023-11-29

## 2023-11-29 RX ORDER — NAPROXEN SODIUM 220 MG/1
81 TABLET, FILM COATED ORAL
COMMUNITY
Start: 2019-02-07 | End: 2023-11-30 | Stop reason: SDUPTHER

## 2023-11-30 ENCOUNTER — OFFICE VISIT (OUTPATIENT)
Dept: HEMATOLOGY/ONCOLOGY | Facility: CLINIC | Age: 73
End: 2023-11-30
Payer: MEDICARE

## 2023-11-30 ENCOUNTER — INFUSION (OUTPATIENT)
Dept: HEMATOLOGY/ONCOLOGY | Facility: CLINIC | Age: 73
End: 2023-11-30
Payer: MEDICARE

## 2023-11-30 VITALS
BODY MASS INDEX: 27.59 KG/M2 | WEIGHT: 160.72 LBS | OXYGEN SATURATION: 97 % | RESPIRATION RATE: 16 BRPM | DIASTOLIC BLOOD PRESSURE: 99 MMHG | HEART RATE: 59 BPM | TEMPERATURE: 97.2 F | SYSTOLIC BLOOD PRESSURE: 165 MMHG

## 2023-11-30 DIAGNOSIS — C43.59 MALIGNANT MELANOMA OF UPPER BACK (MULTI): ICD-10-CM

## 2023-11-30 PROCEDURE — 3080F DIAST BP >= 90 MM HG: CPT | Performed by: NURSE PRACTITIONER

## 2023-11-30 PROCEDURE — 1160F RVW MEDS BY RX/DR IN RCRD: CPT | Performed by: NURSE PRACTITIONER

## 2023-11-30 PROCEDURE — 3077F SYST BP >= 140 MM HG: CPT | Performed by: NURSE PRACTITIONER

## 2023-11-30 PROCEDURE — 99215 OFFICE O/P EST HI 40 MIN: CPT | Performed by: NURSE PRACTITIONER

## 2023-11-30 PROCEDURE — 2500000004 HC RX 250 GENERAL PHARMACY W/ HCPCS (ALT 636 FOR OP/ED): Mod: JZ,JG | Performed by: NURSE PRACTITIONER

## 2023-11-30 PROCEDURE — 1159F MED LIST DOCD IN RCRD: CPT | Performed by: NURSE PRACTITIONER

## 2023-11-30 PROCEDURE — 96413 CHEMO IV INFUSION 1 HR: CPT

## 2023-11-30 PROCEDURE — 1126F AMNT PAIN NOTED NONE PRSNT: CPT | Performed by: NURSE PRACTITIONER

## 2023-11-30 RX ORDER — DIPHENHYDRAMINE HYDROCHLORIDE 50 MG/ML
50 INJECTION INTRAMUSCULAR; INTRAVENOUS AS NEEDED
Status: CANCELLED | OUTPATIENT
Start: 2023-11-30

## 2023-11-30 RX ORDER — PROCHLORPERAZINE EDISYLATE 5 MG/ML
10 INJECTION INTRAMUSCULAR; INTRAVENOUS EVERY 6 HOURS PRN
Status: CANCELLED | OUTPATIENT
Start: 2023-11-30

## 2023-11-30 RX ORDER — FAMOTIDINE 10 MG/ML
20 INJECTION INTRAVENOUS ONCE AS NEEDED
Status: CANCELLED | OUTPATIENT
Start: 2023-11-30

## 2023-11-30 RX ORDER — EPINEPHRINE 0.3 MG/.3ML
0.3 INJECTION SUBCUTANEOUS EVERY 5 MIN PRN
Status: CANCELLED | OUTPATIENT
Start: 2023-11-30

## 2023-11-30 RX ORDER — ALBUTEROL SULFATE 0.83 MG/ML
3 SOLUTION RESPIRATORY (INHALATION) AS NEEDED
Status: DISCONTINUED | OUTPATIENT
Start: 2023-11-30 | End: 2023-11-30 | Stop reason: HOSPADM

## 2023-11-30 RX ORDER — DIPHENHYDRAMINE HYDROCHLORIDE 50 MG/ML
50 INJECTION INTRAMUSCULAR; INTRAVENOUS AS NEEDED
Status: DISCONTINUED | OUTPATIENT
Start: 2023-11-30 | End: 2023-11-30 | Stop reason: HOSPADM

## 2023-11-30 RX ORDER — EPINEPHRINE 0.3 MG/.3ML
0.3 INJECTION SUBCUTANEOUS EVERY 5 MIN PRN
Status: DISCONTINUED | OUTPATIENT
Start: 2023-11-30 | End: 2023-11-30 | Stop reason: HOSPADM

## 2023-11-30 RX ORDER — FAMOTIDINE 10 MG/ML
20 INJECTION INTRAVENOUS ONCE AS NEEDED
Status: DISCONTINUED | OUTPATIENT
Start: 2023-11-30 | End: 2023-11-30 | Stop reason: HOSPADM

## 2023-11-30 RX ORDER — PROCHLORPERAZINE MALEATE 5 MG
10 TABLET ORAL EVERY 6 HOURS PRN
Status: CANCELLED | OUTPATIENT
Start: 2023-11-30

## 2023-11-30 RX ORDER — ALBUTEROL SULFATE 0.83 MG/ML
3 SOLUTION RESPIRATORY (INHALATION) AS NEEDED
Status: CANCELLED | OUTPATIENT
Start: 2023-11-30

## 2023-11-30 RX ORDER — PROCHLORPERAZINE MALEATE 10 MG
10 TABLET ORAL EVERY 6 HOURS PRN
Status: DISCONTINUED | OUTPATIENT
Start: 2023-11-30 | End: 2023-11-30 | Stop reason: HOSPADM

## 2023-11-30 RX ORDER — PROCHLORPERAZINE EDISYLATE 5 MG/ML
10 INJECTION INTRAMUSCULAR; INTRAVENOUS EVERY 6 HOURS PRN
Status: DISCONTINUED | OUTPATIENT
Start: 2023-11-30 | End: 2023-11-30 | Stop reason: HOSPADM

## 2023-11-30 RX ADMIN — SODIUM CHLORIDE 400 MG: 9 INJECTION, SOLUTION INTRAVENOUS at 11:40

## 2023-11-30 ASSESSMENT — PAIN SCALES - GENERAL: PAINLEVEL: 0-NO PAIN

## 2023-11-30 ASSESSMENT — ENCOUNTER SYMPTOMS
RESPIRATORY NEGATIVE: 1
HEMATOLOGIC/LYMPHATIC NEGATIVE: 1
EYES NEGATIVE: 1
CONSTITUTIONAL NEGATIVE: 1
GASTROINTESTINAL NEGATIVE: 1
NEUROLOGICAL NEGATIVE: 1
DEPRESSION: 0
PSYCHIATRIC NEGATIVE: 1
ENDOCRINE NEGATIVE: 1
OCCASIONAL FEELINGS OF UNSTEADINESS: 0
CARDIOVASCULAR NEGATIVE: 1
LOSS OF SENSATION IN FEET: 0

## 2023-11-30 NOTE — SIGNIFICANT EVENT
11/30/23 1108   Prechemo Checklist   Has the patient been in the hospital, ED, or urgent care since last date of service No   Chemo/Immuno Consent Signed Yes   Protocol/Indications Verified Yes   Confirmed to previous date/time of medication Yes   Compared to previous dose Yes   All medications are dated accurately Yes   Pregnancy Test Negative Not applicable   Parameters Met Yes   BSA/Weight-Height Verified Yes   Dose Calculations Verified Yes

## 2023-11-30 NOTE — PROGRESS NOTES
".Patient ID: Gabriel Cowan is a 73 y.o. male.  Referring Physician: Christopher Iglesias MD  [address] [place] [address]  Primary Care Provider: Montana Hernandez DO     Chief Complaint   Patient presents with    Follow-up      HPI   Follow-up    Melanoma       Follow up imaging results and immunotherapy.    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 He met with our team on 4/6/23.      Referring Surgeon: Dr. Kp Kilgore  Dermatologist: Yin Cano DO   Date of first meeting with our team: 04/06/2023     Date of First meeting with surgical team: 02/14/2023  Melanoma type: Cutaneous- Superficial Spreading type  Location of primary site: Left Upper back  Date of WLE and SLNB: 3/6/23  Final pathology: Breslow Depth- 1.6 mm; Ulceration status- Negative; LVI- No; Other high risk features- Microsatelitosis present.  [place] lymph node status: 2/2 positive. Largest deposit 9mm. No JUAN.  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 WLE and SLNB completed on 3/6/23. He was staged at IIIC melanoma. He started keytruda on 4/27/2023 at 400mg IV dose.     Treatment History:   Systemic therapy.   1. Keytruda 400mg (adjuvant setting)  C1- 04/27/2023  C2- 06/08/2023  C3- 07/20/2023  C4- 08/31/2023  C5- 10/12/2023  C6- 11/30/2023    Subjective   Please refer to \"Notes/Cancer History\" above for complete History of present illness.     Mr Gabriel Cowan     - Presents to clinic alone.   - Knee pain is much better. He has been walking 6 miles daily with his dog.   - No new symptoms or concerns.      Review of Systems:   Review of Systems   Constitutional: Negative.    HENT:  Negative.     Eyes: Negative.    Respiratory: Negative.     Cardiovascular: Negative.  "   Gastrointestinal: Negative.    Endocrine: Negative.    Genitourinary: Negative.     Musculoskeletal:         Knee pain improved   Skin: Negative.    Neurological: Negative.    Hematological: Negative.    Psychiatric/Behavioral: Negative.           MEDICAL HISTORY  Past Medical History:   Diagnosis Date    CAD (coronary artery disease)     HTN (hypertension)     Malignant melanoma of skin of back (CMS/HCC)     Melanoma metastatic to lymph node (CMS/HCC)        FAMILY HISTORY  Family History   Problem Relation Name Age of Onset    No Known Problems Mother      No Known Problems Father      No Known Problems Sister      No Known Problems Brother         TOBACCO HISTORY  Tobacco Use: High Risk (11/30/2023)    Patient History     Smoking Tobacco Use: Some Days     Smokeless Tobacco Use: Never     Passive Exposure: Not on file       SOCIAL HISTORY  Social Connections: Not on file   Retired ER physician, , lives with his wife     Outpatient Medication Profile:  Current Outpatient Medications on File Prior to Visit   Medication Sig Dispense Refill    aspirin-calcium carbonate 81 mg-300 mg calcium(777 mg) tablet Take 1 tablet by mouth once daily. 90 each 3    atorvastatin (Lipitor) 80 mg tablet Take 1 tablet (80 mg) by mouth once daily. 90 tablet 3    ezetimibe (Zetia) 10 mg tablet Take 1 tablet (10 mg) by mouth once daily at bedtime. 90 tablet 3    lisinopril 20 mg tablet Take 1 tablet (20 mg) by mouth once daily. 90 tablet 3    metoprolol succinate XL (Toprol-XL) 100 mg 24 hr tablet Take 1 tablet (100 mg) by mouth once daily. Do not crush or chew. 90 tablet 3    pembrolizumab (Keytruda) 25 mg/mL chemo injection Infuse into a venous catheter 1 time.       No current facility-administered medications on file prior to visit.         Performance Status:  Asymptomatic     Vitals and Measurements:   BP (!) 165/99 (BP Location: Left arm, Patient Position: Sitting) Comment: 1st Bp was 166/91 took again minutes later 2nd  165/99 Reported to Iraj  Pulse 59   Temp 36.2 °C (97.2 °F) (Temporal)   Resp 16   Wt 72.9 kg (160 lb 11.5 oz)   SpO2 97%   BMI 27.59 kg/m²       Physical Exam:   Physical Exam  Constitutional:       Appearance: Normal appearance.   HENT:      Head: Normocephalic and atraumatic.      Nose: Nose normal.      Mouth/Throat:      Mouth: Mucous membranes are moist.      Pharynx: Oropharynx is clear.   Eyes:      Conjunctiva/sclera: Conjunctivae normal.   Cardiovascular:      Rate and Rhythm: Normal rate and regular rhythm.      Pulses: Normal pulses.      Heart sounds: Normal heart sounds.   Pulmonary:      Effort: Pulmonary effort is normal.      Breath sounds: Normal breath sounds.   Abdominal:      General: Bowel sounds are normal.      Palpations: Abdomen is soft.   Musculoskeletal:         General: Normal range of motion.      Cervical back: Neck supple.   Skin:     General: Skin is warm and dry.      Comments: Well healed incision on back without nodules    Neurological:      General: No focal deficit present.      Mental Status: He is alert and oriented to person, place, and time.   Psychiatric:         Mood and Affect: Mood normal.         Behavior: Behavior normal.        Lab Results:  I have reviewed these laboratory results:     Lab Results   Component Value Date    WBC 7.9 11/28/2023    HGB 14.9 11/28/2023    HCT 46.0 11/28/2023     11/28/2023     11/28/2023         Chemistry    Lab Results   Component Value Date/Time     11/28/2023 1113    K 4.5 11/28/2023 1113     (H) 11/28/2023 1113    CO2 28 11/28/2023 1113    BUN 16 11/28/2023 1113    CREATININE 0.82 11/28/2023 1113    Lab Results   Component Value Date/Time    CALCIUM 9.2 11/28/2023 1113    ALKPHOS 84 11/28/2023 1113    AST 25 11/28/2023 1113    ALT 30 11/28/2023 1113    BILITOT 0.3 11/28/2023 1113            Lab Results   Component Value Date    TSH 1.31 11/28/2023        Radiology Result:  I have reviewed the  latest Imaging in PACS and the findings are noted in this note. I discussed the results of the latest imaging with the patient. All previous imaging were reviewed at the time it was completed. Full records are available in the EMR for review as well.     === 10/09/23 ===    CT CHEST ABDOMEN PELVIS W IV CONTRAST    - Impression -  No evidence of acute abnormality or metastatic disease in the chest,  abdomen or pelvis.    Detailed discussion of incidental findings as above.    Signed by: Jairo Jennings 10/11/2023 1:08 PM  Dictation workstation:   [id number]    === 04/04/23 ===    MR BRAIN W AND WO CONTRAST    - Impression -  No evidence of intracranial metastatic disease.    Focal hyperintense signal with enhancement within the right frontal  calvarium may represent small hemangiomas versus a prominent venous  [place]. Consider noncontrast CT of the head to further characterize  and attention on follow-up exams.    Mild degree of nonspecific white matter signal compatible with  microangiopathy.    Pathology Results:  I have reviewed the full pathology report recorded in the EMR. The pertinent portions indicating diagnosis are listed here in the note. for details please refer to the full report recorded in the EMR.    I have reviewed the full pathology report recorded in the EMR. The pertinent portions indicating diagnosis are listed here in the note. for details please refer to the full report recorded in the EMR.     Dermatopathology [Jan 26 2023 4:25PM] ([id number])    Specimens: SKIN, L LATERAL BACK     Name JUNIE ROSA     Accession  #: [id number]   Pathologist: KENY LOPEZ MD   Date ofProcedure: 1/24/2023   Date Received: 1/25/2023   Date Reported 1/26/2023   Submitting Physician: BRANDO MOORE DO   Location: [facility] Other External #     FINAL  DIAGNOSIS   SKIN, L LATERAL BACK, SHAVE BIOPSY:   INVASIVE MALIGNANT MELANOMA, EXTENDING TO A DEPTH OF APPROXIMATELY 1.6 MM (SEE COMMENT):     Comment: The broad  shave specimen is sectioned into five portions and reveals a broad severely  atypical compound melanocytic proliferation with areas of tumor regression. The invasive component extends focally to the deep margin at a peripheral margin. See synoptic summary below.     Electronically Signed Out by KENY LOPEZ MD.     Addendum Diagnosis   TEST: BRAF Exon 15 Sequencing   SPECIMEN: FFPE, SKIN LEFT UPPER BACK, [id number] A94   DISEASE DIAGNOSIS: Melanoma   Estimated Tumor Content: 40%   COLLECTION  DATE: 3/6/2023   RECEIVED DATE: 3/20/2023   REPORT DATE: 03/24/2023    RESULT:   BRAF p.V600E ([id number]>A)      INTERPRETATION AND POTENTIAL THERAPEUTIC OPTIONS:   BRAF p.V600E   BRAF V600E mutations are associated with sensitivity to BRAF and MEK   inhibitors.      Assessment and Plan:   Assessment/Plan   Mr. Gabriel Cowan is a 73 y.o. male with a diagnosis of melanoma IIIC. BRAF positive. Please see the evolution of the case listed above in the cancer history.      He continues to do well. No new issues reported this visit. CT scan is unremarkable. Continue with therapy and surveillance.      # Melanoma IIIC  - Continue Keytruda at 400mg IV. Cycle 6 today.   - RTC in 6 weeks on 01/11/2024.  - Labs and CT scan prior 01/08/2024.  - Continue bobby basin u/s with surgical oncology. Last 10/17/2023.  - Continue skin checks with dermatology. Next visit 03/26/2024.     # CAD/HTN  - Recheck BP after infusion is completed.   - Follows with cardiology and his PCP.      DISCLAIMER:   In preparing for this visit and writing this note, I reviewed all the previous electronic medical records (labs, imaging and medical charts) of the patient available in the physician portal. Significant findings which helped in decision making are recorded  in this chart.     The plan was discussed with the patient. We gave him ample opportunities to ask questions. All questions were answered to his satisfaction and he verbalized understanding.

## 2023-11-30 NOTE — PATIENT INSTRUCTIONS
Mr. Gabriel Cowan ,  It was a pleasure talking to you today.    As discussed, this is the plan for you.   Next Keytruda is on 01/11/2024  Labs and CT scan 01/08/2024.  Visit with us on 01/11/2024 for pre-treatment check    Please be advised that you are receiving immunotherapy in form of checkpoint inhibitors. Like we have discussed earlier, and as also listed in the reading material we provided you, these medications have their own unique side effects. We will test your blood prior to each infusion to determine if your organs are working fine and if it is safe to give you treatment. Many symptoms cannot be tested through lab work and are usually manifestations like diarrhea, shortness of breath, fatigue, muscle aches, fogginess of brain, skin rashes, etc. Please let us know if you experience anything out of ordinary and we will be happy to assist you in managing this condition. If at any time you feel that your condition is quite bad and you cannot wait for our office to return the call then please go to the Emergency Room. Please always make sure that all the clinicians involved in your care are aware that you are receiving checkpoint inhibitors.     Our offices will be reaching out to you to make all the appointments. I am always available at the phone numbers listed below for an earlier appointment should you wish one.    In case of an emergency please dial 911 or report to your nearest Emergency Room.  For all other questions, please do not hesitate to reach out to us at the number listed below.    Thank you for choosing [facility] at [facility].   We appreciate your visit.     MD Brisa Landeros, DEMETRIO Mccormick RN    Sarcoma and Cutaneous Oncology team    Phone: [phone]  Fax: [phone].

## 2024-01-09 ENCOUNTER — LAB (OUTPATIENT)
Dept: LAB | Facility: CLINIC | Age: 74
End: 2024-01-09
Payer: MEDICARE

## 2024-01-09 ENCOUNTER — HOSPITAL ENCOUNTER (OUTPATIENT)
Dept: RADIOLOGY | Facility: HOSPITAL | Age: 74
Discharge: HOME | End: 2024-01-09
Payer: MEDICARE

## 2024-01-09 DIAGNOSIS — C43.59 MALIGNANT MELANOMA OF UPPER BACK (MULTI): ICD-10-CM

## 2024-01-09 DIAGNOSIS — Z12.5 SCREENING FOR PROSTATE CANCER: ICD-10-CM

## 2024-01-09 DIAGNOSIS — T73.2XXD FATIGUE DUE TO EXPOSURE, SUBSEQUENT ENCOUNTER: ICD-10-CM

## 2024-01-09 DIAGNOSIS — Z51.12 ENCOUNTER FOR ANTINEOPLASTIC IMMUNOTHERAPY: ICD-10-CM

## 2024-01-09 DIAGNOSIS — C77.9 MELANOMA METASTATIC TO LYMPH NODE (MULTI): ICD-10-CM

## 2024-01-09 LAB
ALBUMIN SERPL BCP-MCNC: 4.1 G/DL (ref 3.4–5)
ALP SERPL-CCNC: 83 U/L (ref 33–136)
ALT SERPL W P-5'-P-CCNC: 26 U/L (ref 10–52)
ANION GAP SERPL CALC-SCNC: 10 MMOL/L (ref 10–20)
AST SERPL W P-5'-P-CCNC: 25 U/L (ref 9–39)
BASOPHILS # BLD AUTO: 0.04 X10*3/UL (ref 0–0.1)
BASOPHILS NFR BLD AUTO: 0.6 %
BILIRUB SERPL-MCNC: 0.8 MG/DL (ref 0–1.2)
BUN SERPL-MCNC: 14 MG/DL (ref 6–23)
CALCIUM SERPL-MCNC: 9.1 MG/DL (ref 8.6–10.3)
CHLORIDE SERPL-SCNC: 101 MMOL/L (ref 98–107)
CO2 SERPL-SCNC: 28 MMOL/L (ref 21–32)
CORTIS AM PEAK SERPL-MSCNC: 8.4 UG/DL (ref 5–20)
CREAT SERPL-MCNC: 0.76 MG/DL (ref 0.5–1.3)
EGFRCR SERPLBLD CKD-EPI 2021: >90 ML/MIN/1.73M*2
EOSINOPHIL # BLD AUTO: 0.03 X10*3/UL (ref 0–0.4)
EOSINOPHIL NFR BLD AUTO: 0.4 %
ERYTHROCYTE [DISTWIDTH] IN BLOOD BY AUTOMATED COUNT: 13.2 % (ref 11.5–14.5)
GGT SERPL-CCNC: 18 U/L (ref 5–64)
GLUCOSE SERPL-MCNC: 96 MG/DL (ref 74–99)
HCT VFR BLD AUTO: 44.3 % (ref 41–52)
HGB BLD-MCNC: 14.7 G/DL (ref 13.5–17.5)
IMM GRANULOCYTES # BLD AUTO: 0.01 X10*3/UL (ref 0–0.5)
IMM GRANULOCYTES NFR BLD AUTO: 0.1 % (ref 0–0.9)
LDH SERPL L TO P-CCNC: 146 U/L (ref 84–246)
LYMPHOCYTES # BLD AUTO: 1.4 X10*3/UL (ref 0.8–3)
LYMPHOCYTES NFR BLD AUTO: 19.8 %
MCH RBC QN AUTO: 32 PG (ref 26–34)
MCHC RBC AUTO-ENTMCNC: 33.2 G/DL (ref 32–36)
MCV RBC AUTO: 97 FL (ref 80–100)
MONOCYTES # BLD AUTO: 0.75 X10*3/UL (ref 0.05–0.8)
MONOCYTES NFR BLD AUTO: 10.6 %
NEUTROPHILS # BLD AUTO: 4.83 X10*3/UL (ref 1.6–5.5)
NEUTROPHILS NFR BLD AUTO: 68.5 %
PLATELET # BLD AUTO: 144 X10*3/UL (ref 150–450)
POTASSIUM SERPL-SCNC: 4.7 MMOL/L (ref 3.5–5.3)
PROT SERPL-MCNC: 6.2 G/DL (ref 6.4–8.2)
PSA SERPL-MCNC: 0.39 NG/ML
RBC # BLD AUTO: 4.59 X10*6/UL (ref 4.5–5.9)
SODIUM SERPL-SCNC: 134 MMOL/L (ref 136–145)
TSH SERPL-ACNC: 2.25 MIU/L (ref 0.44–3.98)
WBC # BLD AUTO: 7.1 X10*3/UL (ref 4.4–11.3)

## 2024-01-09 PROCEDURE — 36415 COLL VENOUS BLD VENIPUNCTURE: CPT

## 2024-01-09 PROCEDURE — 74177 CT ABD & PELVIS W/CONTRAST: CPT

## 2024-01-09 PROCEDURE — 80053 COMPREHEN METABOLIC PANEL: CPT

## 2024-01-09 PROCEDURE — 82024 ASSAY OF ACTH: CPT

## 2024-01-09 PROCEDURE — 84443 ASSAY THYROID STIM HORMONE: CPT

## 2024-01-09 PROCEDURE — 2550000001 HC RX 255 CONTRASTS: Performed by: NURSE PRACTITIONER

## 2024-01-09 PROCEDURE — 85025 COMPLETE CBC W/AUTO DIFF WBC: CPT

## 2024-01-09 PROCEDURE — 82977 ASSAY OF GGT: CPT

## 2024-01-09 PROCEDURE — 83615 LACTATE (LD) (LDH) ENZYME: CPT

## 2024-01-09 PROCEDURE — 84153 ASSAY OF PSA TOTAL: CPT

## 2024-01-09 PROCEDURE — 82533 TOTAL CORTISOL: CPT

## 2024-01-09 RX ADMIN — IOHEXOL 75 ML: 350 INJECTION, SOLUTION INTRAVENOUS at 11:10

## 2024-01-11 ENCOUNTER — OFFICE VISIT (OUTPATIENT)
Dept: HEMATOLOGY/ONCOLOGY | Facility: CLINIC | Age: 74
End: 2024-01-11
Payer: MEDICARE

## 2024-01-11 ENCOUNTER — INFUSION (OUTPATIENT)
Dept: HEMATOLOGY/ONCOLOGY | Facility: CLINIC | Age: 74
End: 2024-01-11
Payer: MEDICARE

## 2024-01-11 VITALS
RESPIRATION RATE: 18 BRPM | DIASTOLIC BLOOD PRESSURE: 74 MMHG | OXYGEN SATURATION: 96 % | HEART RATE: 65 BPM | SYSTOLIC BLOOD PRESSURE: 121 MMHG | WEIGHT: 160.05 LBS | BODY MASS INDEX: 27.47 KG/M2 | TEMPERATURE: 97.2 F

## 2024-01-11 DIAGNOSIS — C43.59 MALIGNANT MELANOMA OF UPPER BACK (MULTI): ICD-10-CM

## 2024-01-11 DIAGNOSIS — Z51.12 ENCOUNTER FOR ANTINEOPLASTIC IMMUNOTHERAPY: ICD-10-CM

## 2024-01-11 DIAGNOSIS — T73.2XXD FATIGUE DUE TO EXPOSURE, SUBSEQUENT ENCOUNTER: Primary | ICD-10-CM

## 2024-01-11 DIAGNOSIS — C77.9 MELANOMA METASTATIC TO LYMPH NODE (MULTI): ICD-10-CM

## 2024-01-11 PROCEDURE — 3074F SYST BP LT 130 MM HG: CPT | Performed by: INTERNAL MEDICINE

## 2024-01-11 PROCEDURE — 99215 OFFICE O/P EST HI 40 MIN: CPT | Performed by: INTERNAL MEDICINE

## 2024-01-11 PROCEDURE — 96413 CHEMO IV INFUSION 1 HR: CPT

## 2024-01-11 PROCEDURE — 1159F MED LIST DOCD IN RCRD: CPT | Performed by: INTERNAL MEDICINE

## 2024-01-11 PROCEDURE — 1160F RVW MEDS BY RX/DR IN RCRD: CPT | Performed by: INTERNAL MEDICINE

## 2024-01-11 PROCEDURE — 3078F DIAST BP <80 MM HG: CPT | Performed by: INTERNAL MEDICINE

## 2024-01-11 PROCEDURE — 2500000004 HC RX 250 GENERAL PHARMACY W/ HCPCS (ALT 636 FOR OP/ED): Mod: JZ,JG | Performed by: INTERNAL MEDICINE

## 2024-01-11 PROCEDURE — 1126F AMNT PAIN NOTED NONE PRSNT: CPT | Performed by: INTERNAL MEDICINE

## 2024-01-11 RX ORDER — HEPARIN 100 UNIT/ML
500 SYRINGE INTRAVENOUS AS NEEDED
Status: DISCONTINUED | OUTPATIENT
Start: 2024-01-11 | End: 2024-01-11 | Stop reason: HOSPADM

## 2024-01-11 RX ORDER — EPINEPHRINE 0.3 MG/.3ML
0.3 INJECTION SUBCUTANEOUS EVERY 5 MIN PRN
Status: CANCELLED | OUTPATIENT
Start: 2024-01-11

## 2024-01-11 RX ORDER — PROCHLORPERAZINE MALEATE 10 MG
10 TABLET ORAL EVERY 6 HOURS PRN
Status: DISCONTINUED | OUTPATIENT
Start: 2024-01-11 | End: 2024-01-11 | Stop reason: HOSPADM

## 2024-01-11 RX ORDER — FAMOTIDINE 10 MG/ML
20 INJECTION INTRAVENOUS ONCE AS NEEDED
Status: CANCELLED | OUTPATIENT
Start: 2024-01-11

## 2024-01-11 RX ORDER — PROCHLORPERAZINE EDISYLATE 5 MG/ML
10 INJECTION INTRAMUSCULAR; INTRAVENOUS EVERY 6 HOURS PRN
Status: CANCELLED | OUTPATIENT
Start: 2024-01-11

## 2024-01-11 RX ORDER — DIPHENHYDRAMINE HYDROCHLORIDE 50 MG/ML
50 INJECTION INTRAMUSCULAR; INTRAVENOUS AS NEEDED
Status: DISCONTINUED | OUTPATIENT
Start: 2024-01-11 | End: 2024-01-11 | Stop reason: HOSPADM

## 2024-01-11 RX ORDER — ALBUTEROL SULFATE 0.83 MG/ML
3 SOLUTION RESPIRATORY (INHALATION) AS NEEDED
Status: DISCONTINUED | OUTPATIENT
Start: 2024-01-11 | End: 2024-01-11 | Stop reason: HOSPADM

## 2024-01-11 RX ORDER — PROCHLORPERAZINE EDISYLATE 5 MG/ML
10 INJECTION INTRAMUSCULAR; INTRAVENOUS EVERY 6 HOURS PRN
Status: DISCONTINUED | OUTPATIENT
Start: 2024-01-11 | End: 2024-01-11 | Stop reason: HOSPADM

## 2024-01-11 RX ORDER — HEPARIN SODIUM,PORCINE/PF 10 UNIT/ML
50 SYRINGE (ML) INTRAVENOUS AS NEEDED
Status: DISCONTINUED | OUTPATIENT
Start: 2024-01-11 | End: 2024-01-11 | Stop reason: HOSPADM

## 2024-01-11 RX ORDER — ALBUTEROL SULFATE 0.83 MG/ML
3 SOLUTION RESPIRATORY (INHALATION) AS NEEDED
Status: CANCELLED | OUTPATIENT
Start: 2024-01-11

## 2024-01-11 RX ORDER — HEPARIN 100 UNIT/ML
500 SYRINGE INTRAVENOUS AS NEEDED
Status: CANCELLED | OUTPATIENT
Start: 2024-01-11

## 2024-01-11 RX ORDER — FAMOTIDINE 10 MG/ML
20 INJECTION INTRAVENOUS ONCE AS NEEDED
Status: DISCONTINUED | OUTPATIENT
Start: 2024-01-11 | End: 2024-01-11 | Stop reason: HOSPADM

## 2024-01-11 RX ORDER — EPINEPHRINE 0.3 MG/.3ML
0.3 INJECTION SUBCUTANEOUS EVERY 5 MIN PRN
Status: DISCONTINUED | OUTPATIENT
Start: 2024-01-11 | End: 2024-01-11 | Stop reason: HOSPADM

## 2024-01-11 RX ORDER — DIPHENHYDRAMINE HYDROCHLORIDE 50 MG/ML
50 INJECTION INTRAMUSCULAR; INTRAVENOUS AS NEEDED
Status: CANCELLED | OUTPATIENT
Start: 2024-01-11

## 2024-01-11 RX ORDER — PROCHLORPERAZINE MALEATE 5 MG
10 TABLET ORAL EVERY 6 HOURS PRN
Status: CANCELLED | OUTPATIENT
Start: 2024-01-11

## 2024-01-11 RX ORDER — HEPARIN SODIUM,PORCINE/PF 10 UNIT/ML
50 SYRINGE (ML) INTRAVENOUS AS NEEDED
Status: CANCELLED | OUTPATIENT
Start: 2024-01-11

## 2024-01-11 RX ADMIN — SODIUM CHLORIDE 400 MG: 9 INJECTION, SOLUTION INTRAVENOUS at 12:00

## 2024-01-11 ASSESSMENT — ENCOUNTER SYMPTOMS
NEUROLOGICAL NEGATIVE: 1
DIZZINESS: 0
BACK PAIN: 0
FREQUENCY: 0
LOSS OF SENSATION IN FEET: 0
CONFUSION: 0
NAUSEA: 0
SORE THROAT: 0
DIFFICULTY URINATING: 0
OCCASIONAL FEELINGS OF UNSTEADINESS: 0
FLANK PAIN: 0
NECK PAIN: 0
CONSTITUTIONAL NEGATIVE: 1
LIGHT-HEADEDNESS: 0
ADENOPATHY: 0
HEMOPTYSIS: 0
TROUBLE SWALLOWING: 0
BRUISES/BLEEDS EASILY: 0
EXTREMITY WEAKNESS: 0
GASTROINTESTINAL NEGATIVE: 1
CARDIOVASCULAR NEGATIVE: 1
DEPRESSION: 0
RESPIRATORY NEGATIVE: 1
NUMBNESS: 0
PSYCHIATRIC NEGATIVE: 1
ARTHRALGIAS: 0
MYALGIAS: 0
NERVOUS/ANXIOUS: 0
EYE PROBLEMS: 0
FATIGUE: 0
EYES NEGATIVE: 1
ENDOCRINE NEGATIVE: 1
DIARRHEA: 0
DYSURIA: 0
BLOOD IN STOOL: 0
ABDOMINAL PAIN: 0
LEG SWELLING: 0
SHORTNESS OF BREATH: 0
CHILLS: 0
APPETITE CHANGE: 0
CONSTIPATION: 0
HEMATOLOGIC/LYMPHATIC NEGATIVE: 1
SEIZURES: 0
VOICE CHANGE: 0
PALPITATIONS: 0
HEMATURIA: 0
FEVER: 0
VOMITING: 0
HOT FLASHES: 0
CHEST TIGHTNESS: 0
SCLERAL ICTERUS: 0

## 2024-01-11 ASSESSMENT — PATIENT HEALTH QUESTIONNAIRE - PHQ9
1. LITTLE INTEREST OR PLEASURE IN DOING THINGS: NOT AT ALL
SUM OF ALL RESPONSES TO PHQ9 QUESTIONS 1 AND 2: 0
2. FEELING DOWN, DEPRESSED OR HOPELESS: NOT AT ALL

## 2024-01-11 ASSESSMENT — COLUMBIA-SUICIDE SEVERITY RATING SCALE - C-SSRS
2. HAVE YOU ACTUALLY HAD ANY THOUGHTS OF KILLING YOURSELF?: NO
6. HAVE YOU EVER DONE ANYTHING, STARTED TO DO ANYTHING, OR PREPARED TO DO ANYTHING TO END YOUR LIFE?: NO
1. IN THE PAST MONTH, HAVE YOU WISHED YOU WERE DEAD OR WISHED YOU COULD GO TO SLEEP AND NOT WAKE UP?: NO

## 2024-01-11 ASSESSMENT — PAIN SCALES - GENERAL: PAINLEVEL: 0-NO PAIN

## 2024-01-11 NOTE — PROGRESS NOTES
".Patient ID: Gabriel Cowan is a 73 y.o. male.  Referring Physician: Christopher Iglesias MD  [address] [place] [address]  Primary Care Provider: Montana Hernandez DO     Chief Complaint   Patient presents with    Follow-up    Melanoma     Encounter for immunotherapy and CT scan follow up.       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 He met with our team on 4/6/23.      Referring Surgeon: Dr. Kp Kilgore  Dermatologist: Yin Cano DO   Date of first meeting with our team: 04/06/2023     Date of First meeting with surgical team: 02/14/2023  Melanoma type: Cutaneous- Superficial Spreading type  Location of primary site: Left Upper back  Date of WLE and SLNB: 3/6/23  Final pathology: Breslow Depth- 1.6 mm; Ulceration status- Negative; LVI- No; Other high risk features- Microsatelitosis present.  [place] lymph node status: 2/2 positive. Largest deposit 9mm. No JUAN.  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 WLE and SLNB completed on 3/6/23. He was staged at IIIC melanoma. He started keytruda on 4/27/2023 at 400mg IV dose. Surveillance CT scans did not show recurrence till 01/09/24.      Treatment History:   Systemic therapy.   1. Keytruda 400mg (adjuvant setting)  C1- 04/27/2023  C2- 06/08/2023  C3- 07/20/2023  C4- 08/31/2023  C5- 10/12/2023  C6- 11/30/2023  C7- 01/11/2024      Subjective   Please refer to \"Notes/Cancer History\" above for complete History of present illness.     Mr Gabriel Cowan comes to clinic alone. He is doing well and reports that his knee pain is much better.      Review of Systems:   Review of Systems   Constitutional: Negative.  Negative for appetite change, chills, fatigue and fever.   HENT:  Negative.  Negative for " hearing loss, mouth sores, sore throat, trouble swallowing and voice change.    Eyes: Negative.  Negative for eye problems and icterus.   Respiratory: Negative.  Negative for chest tightness, hemoptysis and shortness of breath.    Cardiovascular: Negative.  Negative for chest pain, leg swelling and palpitations.   Gastrointestinal: Negative.  Negative for abdominal pain, blood in stool, constipation, diarrhea, nausea and vomiting.   Endocrine: Negative.  Negative for hot flashes.   Genitourinary: Negative.  Negative for difficulty urinating, dysuria, frequency, hematuria, nocturia and pelvic pain.    Musculoskeletal:  Negative for arthralgias, back pain, flank pain, gait problem, myalgias and neck pain.        Knee pain improved   Skin: Negative.  Negative for itching and rash.   Neurological: Negative.  Negative for dizziness, extremity weakness, gait problem, light-headedness, numbness and seizures.   Hematological: Negative.  Negative for adenopathy. Does not bruise/bleed easily.   Psychiatric/Behavioral: Negative.  Negative for confusion and depression. The patient is not nervous/anxious.          MEDICAL HISTORY  Past Medical History:   Diagnosis Date    CAD (coronary artery disease)     HTN (hypertension)     Malignant melanoma of skin of back (CMS/HCC)     Melanoma metastatic to lymph node (CMS/HCC)        FAMILY HISTORY  Family History   Problem Relation Name Age of Onset    No Known Problems Mother      No Known Problems Father      No Known Problems Sister      No Known Problems Brother         TOBACCO HISTORY  Tobacco Use: High Risk (1/11/2024)    Patient History     Smoking Tobacco Use: Some Days     Smokeless Tobacco Use: Never     Passive Exposure: Never       SOCIAL HISTORY  Social Connections: Not on file   Retired ER physician, , lives with his wife     Outpatient Medication Profile:  Current Outpatient Medications on File Prior to Visit   Medication Sig Dispense Refill    aspirin-calcium  carbonate 81 mg-300 mg calcium(777 mg) tablet Take 1 tablet by mouth once daily. 90 each 3    atorvastatin (Lipitor) 80 mg tablet Take 1 tablet (80 mg) by mouth once daily. 90 tablet 3    ezetimibe (Zetia) 10 mg tablet Take 1 tablet (10 mg) by mouth once daily at bedtime. 90 tablet 3    lisinopril 20 mg tablet Take 1 tablet (20 mg) by mouth once daily. 90 tablet 3    metoprolol succinate XL (Toprol-XL) 100 mg 24 hr tablet Take 1 tablet (100 mg) by mouth once daily. Do not crush or chew. 90 tablet 3    pembrolizumab (Keytruda) 25 mg/mL chemo injection Infuse into a venous catheter 1 time.       No current facility-administered medications on file prior to visit.         Performance Status:  Asymptomatic     Vitals and Measurements:   /74 (BP Location: Left arm, Patient Position: Sitting, BP Cuff Size: Adult)   Pulse 65   Temp 36.2 °C (97.2 °F) (Tympanic)   Resp 18   Wt 72.6 kg (160 lb 0.9 oz)   SpO2 96%   BMI 27.47 kg/m²       Physical Exam:   Physical Exam  Constitutional:       General: He is not in acute distress.     Appearance: Normal appearance. He is not ill-appearing.   HENT:      Head: Normocephalic and atraumatic.      Nose: Nose normal.      Mouth/Throat:      Mouth: Mucous membranes are moist.      Pharynx: Oropharynx is clear.   Eyes:      Extraocular Movements: Extraocular movements intact.      Conjunctiva/sclera: Conjunctivae normal.      Pupils: Pupils are equal, round, and reactive to light.   Cardiovascular:      Rate and Rhythm: Normal rate and regular rhythm.      Pulses: Normal pulses.      Heart sounds: Normal heart sounds.   Pulmonary:      Effort: Pulmonary effort is normal.      Breath sounds: Normal breath sounds. No wheezing, rhonchi or rales.   Abdominal:      General: Abdomen is flat. Bowel sounds are normal.      Palpations: Abdomen is soft. There is no mass.      Tenderness: There is no abdominal tenderness. There is no rebound.   Musculoskeletal:         General: Normal  range of motion.      Cervical back: Neck supple.   Skin:     General: Skin is warm and dry.      Findings: No bruising or erythema.      Comments: Well healed incision on back without nodules    Neurological:      General: No focal deficit present.      Mental Status: He is alert and oriented to person, place, and time.   Psychiatric:         Mood and Affect: Mood normal.         Behavior: Behavior normal.         Thought Content: Thought content normal.         Judgment: Judgment normal.        Lab Results:  I have reviewed these laboratory results:     Lab on 01/09/2024   Component Date Value Ref Range Status    WBC 01/09/2024 7.1  4.4 - 11.3 x10*3/uL Final    RBC 01/09/2024 4.59  4.50 - 5.90 x10*6/uL Final    Hemoglobin 01/09/2024 14.7  13.5 - 17.5 g/dL Final    Hematocrit 01/09/2024 44.3  41.0 - 52.0 % Final    MCV 01/09/2024 97  80 - 100 fL Final    MCH 01/09/2024 32.0  26.0 - 34.0 pg Final    MCHC 01/09/2024 33.2  32.0 - 36.0 g/dL Final    RDW 01/09/2024 13.2  11.5 - 14.5 % Final    Platelets 01/09/2024 144 (L)  150 - 450 x10*3/uL Final    Neutrophils % 01/09/2024 68.5  40.0 - 80.0 % Final    Immature Granulocytes %, Automated 01/09/2024 0.1  0.0 - 0.9 % Final    Lymphocytes % 01/09/2024 19.8  13.0 - 44.0 % Final    Monocytes % 01/09/2024 10.6  2.0 - 10.0 % Final    Eosinophils % 01/09/2024 0.4  0.0 - 6.0 % Final    Basophils % 01/09/2024 0.6  0.0 - 2.0 % Final    Neutrophils Absolute 01/09/2024 4.83  1.60 - 5.50 x10*3/uL Final    Immature Granulocytes Absolute, Au* 01/09/2024 0.01  0.00 - 0.50 x10*3/uL Final    Lymphocytes Absolute 01/09/2024 1.40  0.80 - 3.00 x10*3/uL Final    Monocytes Absolute 01/09/2024 0.75  0.05 - 0.80 x10*3/uL Final    Eosinophils Absolute 01/09/2024 0.03  0.00 - 0.40 x10*3/uL Final    Basophils Absolute 01/09/2024 0.04  0.00 - 0.10 x10*3/uL Final    Glucose 01/09/2024 96  74 - 99 mg/dL Final    Sodium 01/09/2024 134 (L)  136 - 145 mmol/L Final    Potassium 01/09/2024 4.7  3.5 - 5.3  mmol/L Final    Chloride 01/09/2024 101  98 - 107 mmol/L Final    Bicarbonate 01/09/2024 28  21 - 32 mmol/L Final    Anion Gap 01/09/2024 10  10 - 20 mmol/L Final    Urea Nitrogen 01/09/2024 14  6 - 23 mg/dL Final    Creatinine 01/09/2024 0.76  0.50 - 1.30 mg/dL Final    eGFR 01/09/2024 >90  >60 mL/min/1.73m*2 Final    Calcium 01/09/2024 9.1  8.6 - 10.3 mg/dL Final    Albumin 01/09/2024 4.1  3.4 - 5.0 g/dL Final    Alkaline Phosphatase 01/09/2024 83  33 - 136 U/L Final    Total Protein 01/09/2024 6.2 (L)  6.4 - 8.2 g/dL Final    AST 01/09/2024 25  9 - 39 U/L Final    Bilirubin, Total 01/09/2024 0.8  0.0 - 1.2 mg/dL Final    ALT 01/09/2024 26  10 - 52 U/L Final    Cortisol  A.M. 01/09/2024 8.4  5.0 - 20.0 ug/dL Final    Thyroid Stimulating Hormone 01/09/2024 2.25  0.44 - 3.98 mIU/L Final    GGT 01/09/2024 18  5 - 64 U/L Final    LDH 01/09/2024 146  84 - 246 U/L Final    Prostate Specific AG 01/09/2024 0.39  <=4.00 ng/mL Final        Radiology Result:  I have reviewed the latest Imaging in PACS and the findings are noted in this note. I discussed the results of the latest imaging with the patient. All previous imaging were reviewed at the time it was completed. Full records are available in the EMR for review as well.     CT scan has not been read yet.     Pathology Results:  I have reviewed the full pathology report recorded in the EMR. The pertinent portions indicating diagnosis are listed here in the note. for details please refer to the full report recorded in the EMR.    I have reviewed the full pathology report recorded in the EMR. The pertinent portions indicating diagnosis are listed here in the note. for details please refer to the full report recorded in the EMR.     Dermatopathology [Jan 26 2023 4:25PM] ([id number])    Specimens: SKIN, L LATERAL BACK     Name JUNIE ROSA     Accession  #: [id number]   Pathologist: KENY LOPEZ MD   Date ofProcedure: 1/24/2023   Date Received: 1/25/2023   Date  Reported 1/26/2023   Submitting Physician: BRANDO MOORE DO   Location: [facility] Other External #     FINAL  DIAGNOSIS   SKIN, L LATERAL BACK, SHAVE BIOPSY:   INVASIVE MALIGNANT MELANOMA, EXTENDING TO A DEPTH OF APPROXIMATELY 1.6 MM (SEE COMMENT):     Comment: The broad shave specimen is sectioned into five portions and reveals a broad severely  atypical compound melanocytic proliferation with areas of tumor regression. The invasive component extends focally to the deep margin at a peripheral margin. See synoptic summary below.     Electronically Signed Out by KENY LOPEZ MD.     Addendum Diagnosis   TEST: BRAF Exon 15 Sequencing   SPECIMEN: FFPE, SKIN LEFT UPPER BACK, [id number] J20   DISEASE DIAGNOSIS: Melanoma   Estimated Tumor Content: 40%   COLLECTION  DATE: 3/6/2023   RECEIVED DATE: 3/20/2023   REPORT DATE: 03/24/2023    RESULT:   BRAF p.V600E ([id number])      INTERPRETATION AND POTENTIAL THERAPEUTIC OPTIONS:   BRAF p.V600E   BRAF V600E mutations are associated with sensitivity to BRAF and MEK   inhibitors.      Assessment and Plan:   Assessment/Plan   Mr. Gabriel Cowan is a 73 y.o. male with a diagnosis of melanoma IIIC. BRAF positive. Please see the evolution of the case listed above in the cancer history.      He continues to do well. No new issues reported this visit. CT scan is unremarkable. Continue with therapy and surveillance.      # Melanoma IIIC  - Continue Keytruda at 400mg IV. Cycle 7 today.   - RTC in 6 weeks on 02/22/2024.  - Continue bobby basin u/s with surgical oncology.  - Continue skin checks with dermatology.     # CAD/HTN  - Recheck BP after infusion is completed.   - Follows with cardiology and his PCP.      DISCLAIMER:   In preparing for this visit and writing this note, I reviewed all the previous electronic medical records (labs, imaging and medical charts) of the patient available in the physician portal. Significant findings which helped in decision making are recorded  in  this chart.     The plan was discussed with the patient. We gave him ample opportunities to ask questions. All questions were answered to his satisfaction and he verbalized understanding.

## 2024-01-11 NOTE — SIGNIFICANT EVENT
01/11/24 1140   Prechemo Checklist   Chemo/Immuno Consent Signed Yes   Protocol/Indications Verified Yes   Confirmed to previous date/time of medication Yes   Compared to previous dose Yes   All medications are dated accurately Yes   Pregnancy Test Negative Not applicable   Parameters Met Yes   BSA/Weight-Height Verified Yes   Dose Calculations Verified Yes

## 2024-01-12 LAB — ACTH PLAS-MCNC: 10.9 PG/ML (ref 7.2–63.3)

## 2024-02-20 ENCOUNTER — LAB (OUTPATIENT)
Dept: LAB | Facility: CLINIC | Age: 74
End: 2024-02-20
Payer: MEDICARE

## 2024-02-20 DIAGNOSIS — Z51.12 ENCOUNTER FOR ANTINEOPLASTIC IMMUNOTHERAPY: ICD-10-CM

## 2024-02-20 DIAGNOSIS — T73.2XXD FATIGUE DUE TO EXPOSURE, SUBSEQUENT ENCOUNTER: ICD-10-CM

## 2024-02-20 DIAGNOSIS — C77.9 MELANOMA METASTATIC TO LYMPH NODE (MULTI): ICD-10-CM

## 2024-02-20 DIAGNOSIS — C43.59 MALIGNANT MELANOMA OF UPPER BACK (MULTI): ICD-10-CM

## 2024-02-20 LAB
ALBUMIN SERPL BCP-MCNC: 4.5 G/DL (ref 3.4–5)
ALP SERPL-CCNC: 84 U/L (ref 33–136)
ALT SERPL W P-5'-P-CCNC: 19 U/L (ref 10–52)
ANION GAP SERPL CALC-SCNC: 10 MMOL/L (ref 10–20)
AST SERPL W P-5'-P-CCNC: 18 U/L (ref 9–39)
BASOPHILS # BLD AUTO: 0.04 X10*3/UL (ref 0–0.1)
BASOPHILS NFR BLD AUTO: 0.5 %
BILIRUB SERPL-MCNC: 0.7 MG/DL (ref 0–1.2)
BUN SERPL-MCNC: 18 MG/DL (ref 6–23)
CALCIUM SERPL-MCNC: 9.6 MG/DL (ref 8.6–10.3)
CHLORIDE SERPL-SCNC: 103 MMOL/L (ref 98–107)
CO2 SERPL-SCNC: 29 MMOL/L (ref 21–32)
CORTIS AM PEAK SERPL-MSCNC: 19 UG/DL (ref 5–20)
CREAT SERPL-MCNC: 0.85 MG/DL (ref 0.5–1.3)
EGFRCR SERPLBLD CKD-EPI 2021: >90 ML/MIN/1.73M*2
EOSINOPHIL # BLD AUTO: 0.07 X10*3/UL (ref 0–0.4)
EOSINOPHIL NFR BLD AUTO: 0.8 %
ERYTHROCYTE [DISTWIDTH] IN BLOOD BY AUTOMATED COUNT: 13 % (ref 11.5–14.5)
GGT SERPL-CCNC: 14 U/L (ref 5–64)
GLUCOSE SERPL-MCNC: 113 MG/DL (ref 74–99)
HCT VFR BLD AUTO: 48.5 % (ref 41–52)
HGB BLD-MCNC: 16.1 G/DL (ref 13.5–17.5)
IMM GRANULOCYTES # BLD AUTO: 0.02 X10*3/UL (ref 0–0.5)
IMM GRANULOCYTES NFR BLD AUTO: 0.2 % (ref 0–0.9)
LDH SERPL L TO P-CCNC: 155 U/L (ref 84–246)
LYMPHOCYTES # BLD AUTO: 1.59 X10*3/UL (ref 0.8–3)
LYMPHOCYTES NFR BLD AUTO: 18.4 %
MCH RBC QN AUTO: 32.1 PG (ref 26–34)
MCHC RBC AUTO-ENTMCNC: 33.2 G/DL (ref 32–36)
MCV RBC AUTO: 97 FL (ref 80–100)
MONOCYTES # BLD AUTO: 0.92 X10*3/UL (ref 0.05–0.8)
MONOCYTES NFR BLD AUTO: 10.6 %
NEUTROPHILS # BLD AUTO: 6 X10*3/UL (ref 1.6–5.5)
NEUTROPHILS NFR BLD AUTO: 69.5 %
PLATELET # BLD AUTO: 146 X10*3/UL (ref 150–450)
POTASSIUM SERPL-SCNC: 4.4 MMOL/L (ref 3.5–5.3)
PROT SERPL-MCNC: 6.9 G/DL (ref 6.4–8.2)
RBC # BLD AUTO: 5.01 X10*6/UL (ref 4.5–5.9)
SODIUM SERPL-SCNC: 138 MMOL/L (ref 136–145)
TSH SERPL-ACNC: 2.48 MIU/L (ref 0.44–3.98)
WBC # BLD AUTO: 8.6 X10*3/UL (ref 4.4–11.3)

## 2024-02-20 PROCEDURE — 80053 COMPREHEN METABOLIC PANEL: CPT

## 2024-02-20 PROCEDURE — 83615 LACTATE (LD) (LDH) ENZYME: CPT

## 2024-02-20 PROCEDURE — 82977 ASSAY OF GGT: CPT

## 2024-02-20 PROCEDURE — 36415 COLL VENOUS BLD VENIPUNCTURE: CPT

## 2024-02-20 PROCEDURE — 84443 ASSAY THYROID STIM HORMONE: CPT

## 2024-02-20 PROCEDURE — 82533 TOTAL CORTISOL: CPT

## 2024-02-20 PROCEDURE — 85025 COMPLETE CBC W/AUTO DIFF WBC: CPT

## 2024-02-22 ENCOUNTER — OFFICE VISIT (OUTPATIENT)
Dept: HEMATOLOGY/ONCOLOGY | Facility: CLINIC | Age: 74
End: 2024-02-22
Payer: MEDICARE

## 2024-02-22 ENCOUNTER — INFUSION (OUTPATIENT)
Dept: HEMATOLOGY/ONCOLOGY | Facility: CLINIC | Age: 74
End: 2024-02-22
Payer: MEDICARE

## 2024-02-22 VITALS
SYSTOLIC BLOOD PRESSURE: 147 MMHG | HEART RATE: 65 BPM | DIASTOLIC BLOOD PRESSURE: 82 MMHG | BODY MASS INDEX: 27.4 KG/M2 | RESPIRATION RATE: 16 BRPM | WEIGHT: 159.6 LBS | TEMPERATURE: 97.3 F | OXYGEN SATURATION: 95 %

## 2024-02-22 DIAGNOSIS — C43.59 MALIGNANT MELANOMA OF UPPER BACK (MULTI): ICD-10-CM

## 2024-02-22 DIAGNOSIS — C43.59 MALIGNANT MELANOMA OF UPPER BACK (MULTI): Primary | ICD-10-CM

## 2024-02-22 PROCEDURE — 1159F MED LIST DOCD IN RCRD: CPT | Performed by: NURSE PRACTITIONER

## 2024-02-22 PROCEDURE — 99215 OFFICE O/P EST HI 40 MIN: CPT | Performed by: NURSE PRACTITIONER

## 2024-02-22 PROCEDURE — 99215 OFFICE O/P EST HI 40 MIN: CPT | Mod: 25 | Performed by: NURSE PRACTITIONER

## 2024-02-22 PROCEDURE — 3079F DIAST BP 80-89 MM HG: CPT | Performed by: NURSE PRACTITIONER

## 2024-02-22 PROCEDURE — 96413 CHEMO IV INFUSION 1 HR: CPT

## 2024-02-22 PROCEDURE — 1126F AMNT PAIN NOTED NONE PRSNT: CPT | Performed by: NURSE PRACTITIONER

## 2024-02-22 PROCEDURE — 3077F SYST BP >= 140 MM HG: CPT | Performed by: NURSE PRACTITIONER

## 2024-02-22 PROCEDURE — 2500000004 HC RX 250 GENERAL PHARMACY W/ HCPCS (ALT 636 FOR OP/ED): Mod: JZ,JG | Performed by: NURSE PRACTITIONER

## 2024-02-22 RX ORDER — EPINEPHRINE 0.3 MG/.3ML
0.3 INJECTION SUBCUTANEOUS EVERY 5 MIN PRN
Status: DISCONTINUED | OUTPATIENT
Start: 2024-02-22 | End: 2024-02-22 | Stop reason: HOSPADM

## 2024-02-22 RX ORDER — HEPARIN 100 UNIT/ML
500 SYRINGE INTRAVENOUS AS NEEDED
OUTPATIENT
Start: 2024-02-22

## 2024-02-22 RX ORDER — EPINEPHRINE 0.3 MG/.3ML
0.3 INJECTION SUBCUTANEOUS EVERY 5 MIN PRN
Status: CANCELLED | OUTPATIENT
Start: 2024-02-22

## 2024-02-22 RX ORDER — ALBUTEROL SULFATE 0.83 MG/ML
3 SOLUTION RESPIRATORY (INHALATION) AS NEEDED
Status: CANCELLED | OUTPATIENT
Start: 2024-02-22

## 2024-02-22 RX ORDER — FAMOTIDINE 10 MG/ML
20 INJECTION INTRAVENOUS ONCE AS NEEDED
Status: DISCONTINUED | OUTPATIENT
Start: 2024-02-22 | End: 2024-02-22 | Stop reason: HOSPADM

## 2024-02-22 RX ORDER — PROCHLORPERAZINE MALEATE 10 MG
10 TABLET ORAL EVERY 6 HOURS PRN
Status: DISCONTINUED | OUTPATIENT
Start: 2024-02-22 | End: 2024-02-22 | Stop reason: HOSPADM

## 2024-02-22 RX ORDER — FAMOTIDINE 10 MG/ML
20 INJECTION INTRAVENOUS ONCE AS NEEDED
Status: CANCELLED | OUTPATIENT
Start: 2024-02-22

## 2024-02-22 RX ORDER — ALBUTEROL SULFATE 0.83 MG/ML
3 SOLUTION RESPIRATORY (INHALATION) AS NEEDED
Status: DISCONTINUED | OUTPATIENT
Start: 2024-02-22 | End: 2024-02-22 | Stop reason: HOSPADM

## 2024-02-22 RX ORDER — HEPARIN SODIUM,PORCINE/PF 10 UNIT/ML
50 SYRINGE (ML) INTRAVENOUS AS NEEDED
OUTPATIENT
Start: 2024-02-22

## 2024-02-22 RX ORDER — PROCHLORPERAZINE EDISYLATE 5 MG/ML
10 INJECTION INTRAMUSCULAR; INTRAVENOUS EVERY 6 HOURS PRN
Status: DISCONTINUED | OUTPATIENT
Start: 2024-02-22 | End: 2024-02-22 | Stop reason: HOSPADM

## 2024-02-22 RX ORDER — DIPHENHYDRAMINE HYDROCHLORIDE 50 MG/ML
50 INJECTION INTRAMUSCULAR; INTRAVENOUS AS NEEDED
Status: DISCONTINUED | OUTPATIENT
Start: 2024-02-22 | End: 2024-02-22 | Stop reason: HOSPADM

## 2024-02-22 RX ORDER — DIPHENHYDRAMINE HYDROCHLORIDE 50 MG/ML
50 INJECTION INTRAMUSCULAR; INTRAVENOUS AS NEEDED
Status: CANCELLED | OUTPATIENT
Start: 2024-02-22

## 2024-02-22 RX ORDER — PROCHLORPERAZINE EDISYLATE 5 MG/ML
10 INJECTION INTRAMUSCULAR; INTRAVENOUS EVERY 6 HOURS PRN
Status: CANCELLED | OUTPATIENT
Start: 2024-02-22

## 2024-02-22 RX ORDER — PROCHLORPERAZINE MALEATE 5 MG
10 TABLET ORAL EVERY 6 HOURS PRN
Status: CANCELLED | OUTPATIENT
Start: 2024-02-22

## 2024-02-22 RX ADMIN — SODIUM CHLORIDE 400 MG: 9 INJECTION, SOLUTION INTRAVENOUS at 11:12

## 2024-02-22 ASSESSMENT — ENCOUNTER SYMPTOMS
CARDIOVASCULAR NEGATIVE: 1
NEUROLOGICAL NEGATIVE: 1
RESPIRATORY NEGATIVE: 1
DEPRESSION: 0
GASTROINTESTINAL NEGATIVE: 1
ENDOCRINE NEGATIVE: 1
PSYCHIATRIC NEGATIVE: 1
OCCASIONAL FEELINGS OF UNSTEADINESS: 0
EYES NEGATIVE: 1
CONSTITUTIONAL NEGATIVE: 1
HEMATOLOGIC/LYMPHATIC NEGATIVE: 1
LOSS OF SENSATION IN FEET: 0

## 2024-02-22 ASSESSMENT — PAIN SCALES - GENERAL: PAINLEVEL: 0-NO PAIN

## 2024-02-22 NOTE — PROGRESS NOTES
".Patient ID: Gabriel Cowan is a 73 y.o. male.  Referring Physician: Christopher Iglesias MD  [address] [place] [address]  Primary Care Provider: Montana Hernandez DO     Chief Complaint   Patient presents with    Follow-up      HPI   Follow-up    Melanoma       Follow up imaging results and immunotherapy.    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 He met with our team on 4/6/23.      Referring Surgeon: Dr. Kp Kilgore  Dermatologist: Yin Cano DO   Date of first meeting with our team: 04/06/2023     Date of First meeting with surgical team: 02/14/2023  Melanoma type: Cutaneous- Superficial Spreading type  Location of primary site: Left Upper back  Date of WLE and SLNB: 3/6/23  Final pathology: Breslow Depth- 1.6 mm; Ulceration status- Negative; LVI- No; Other high risk features- Microsatelitosis present.  [place] lymph node status: 2/2 positive. Largest deposit 9mm. No JUAN.  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 WLE and SLNB completed on 3/6/23. He was staged at IIIC melanoma. He started keytruda on 4/27/2023 at 400mg IV dose. Surveillance CT scans 01/09/24 did not show recurrence.     Treatment History:   Systemic therapy.   1. Keytruda 400mg (adjuvant setting)  C1- 04/27/2023, C2- 06/08/2023, C3- 07/20/2023, C4- 08/31/2023, C5- 10/12/2023, C6- 11/30/2023  C7- 01/11/2024, C8- 02/22/2024    Subjective   Please refer to \"Notes/Cancer History\" above for complete History of present illness.     Mr Gabriel Cowan     - Presents to clinic alone.   - Continues to walk 8 miles 5 days a week.  - Watches his grandson on Fridays. Caught a cold (runny nose, congestion). No fevers, chills, cough or SOB.      Review of Systems:   Review of Systems "   Constitutional: Negative.    HENT:  Negative.          Runny nose, congestion   Eyes: Negative.    Respiratory: Negative.     Cardiovascular: Negative.    Gastrointestinal: Negative.    Endocrine: Negative.    Genitourinary: Negative.     Skin: Negative.    Neurological: Negative.    Hematological: Negative.    Psychiatric/Behavioral: Negative.           MEDICAL HISTORY  Past Medical History:   Diagnosis Date    CAD (coronary artery disease)     HTN (hypertension)     Malignant melanoma of skin of back (CMS/HCC)     Melanoma metastatic to lymph node (CMS/HCC)        FAMILY HISTORY  Family History   Problem Relation Name Age of Onset    No Known Problems Mother      No Known Problems Father      No Known Problems Sister      No Known Problems Brother         TOBACCO HISTORY  Tobacco Use: High Risk (1/11/2024)    Patient History     Smoking Tobacco Use: Some Days     Smokeless Tobacco Use: Never     Passive Exposure: Never       SOCIAL HISTORY  Social Connections: Not on file   Retired ER physician, , lives with his wife     Outpatient Medication Profile:  Current Outpatient Medications on File Prior to Visit   Medication Sig Dispense Refill    aspirin-calcium carbonate 81 mg-300 mg calcium(777 mg) tablet Take 1 tablet by mouth once daily. 90 each 3    atorvastatin (Lipitor) 80 mg tablet Take 1 tablet (80 mg) by mouth once daily. 90 tablet 3    ezetimibe (Zetia) 10 mg tablet Take 1 tablet (10 mg) by mouth once daily at bedtime. 90 tablet 3    lisinopril 20 mg tablet Take 1 tablet (20 mg) by mouth once daily. 90 tablet 3    metoprolol succinate XL (Toprol-XL) 100 mg 24 hr tablet Take 1 tablet (100 mg) by mouth once daily. Do not crush or chew. 90 tablet 3    pembrolizumab (Keytruda) 25 mg/mL chemo injection Infuse into a venous catheter 1 time.       No current facility-administered medications on file prior to visit.         Performance Status:  Asymptomatic     Vitals and Measurements:   /82 (BP  Location: Left arm, Patient Position: Sitting)   Pulse 65   Temp 36.3 °C (97.3 °F) (Temporal)   Resp 16   Wt 72.4 kg (159 lb 9.6 oz)   SpO2 95%   BMI 27.40 kg/m²       Physical Exam:   Physical Exam  Constitutional:       Appearance: Normal appearance.   HENT:      Head: Normocephalic and atraumatic.      Nose: Nose normal.      Mouth/Throat:      Mouth: Mucous membranes are moist.      Pharynx: Oropharynx is clear.   Eyes:      Conjunctiva/sclera: Conjunctivae normal.   Cardiovascular:      Rate and Rhythm: Normal rate and regular rhythm.      Pulses: Normal pulses.      Heart sounds: Normal heart sounds.   Pulmonary:      Effort: Pulmonary effort is normal.      Breath sounds: Normal breath sounds.   Abdominal:      General: Bowel sounds are normal.      Palpations: Abdomen is soft.   Musculoskeletal:         General: Normal range of motion.      Cervical back: Neck supple.   Skin:     General: Skin is warm and dry.      Comments: Well healed incision on back without nodules    Neurological:      General: No focal deficit present.      Mental Status: He is alert and oriented to person, place, and time.   Psychiatric:         Mood and Affect: Mood normal.         Behavior: Behavior normal.      Lab Results:  I have reviewed these laboratory results:     Lab Results   Component Value Date    WBC 8.6 02/20/2024    HGB 16.1 02/20/2024    HCT 48.5 02/20/2024    MCV 97 02/20/2024     (L) 02/20/2024         Chemistry    Lab Results   Component Value Date/Time     02/20/2024 0924    K 4.4 02/20/2024 0924     02/20/2024 0924    CO2 29 02/20/2024 0924    BUN 18 02/20/2024 0924    CREATININE 0.85 02/20/2024 0924    Lab Results   Component Value Date/Time    CALCIUM 9.6 02/20/2024 0924    ALKPHOS 84 02/20/2024 0924    AST 18 02/20/2024 0924    ALT 19 02/20/2024 0924    BILITOT 0.7 02/20/2024 0924            Lab Results   Component Value Date    TSH 2.48 02/20/2024        Radiology Result:  I have  reviewed the latest Imaging in PACS and the findings are noted in this note. I discussed the results of the latest imaging with the patient. All previous imaging were reviewed at the time it was completed. Full records are available in the EMR for review as well.     CT CHEST ABDOMEN PELVIS W IV CONTRAST; 1/9/2024 11:21 am   IMPRESSION:  CHEST  1.  Fluid in the esophagus may reflect reflux. No acute  cardiopulmonary process.      ABDOMEN - PELVIS  1.  No acute intra-abdominal process. No change from prior examination      === 04/04/23 ===    MR BRAIN W AND WO CONTRAST    - Impression -  No evidence of intracranial metastatic disease.    Focal hyperintense signal with enhancement within the right frontal  calvarium may represent small hemangiomas versus a prominent venous  Lakes. Consider noncontrast CT of the head to further characterize  and attention on follow-up exams.    Mild degree of nonspecific white matter signal compatible with  microangiopathy.    Pathology Results:  I have reviewed the full pathology report recorded in the EMR. The pertinent portions indicating diagnosis are listed here in the note. for details please refer to the full report recorded in the EMR.    I have reviewed the full pathology report recorded in the EMR. The pertinent portions indicating diagnosis are listed here in the note. for details please refer to the full report recorded in the EMR.     Dermatopathology [Jan 26 2023 4:25PM] ([id number])    Specimens: SKIN, L LATERAL BACK     Name JUNIE ROSA     Accession  #: [id number]   Pathologist: KENY LOPEZ MD   Date ofProcedure: 1/24/2023   Date Received: 1/25/2023   Date Reported 1/26/2023   Submitting Physician: BRANDO MOORE DO   Location: [facility] Other External #     FINAL  DIAGNOSIS   SKIN, L LATERAL BACK, SHAVE BIOPSY:   INVASIVE MALIGNANT MELANOMA, EXTENDING TO A DEPTH OF APPROXIMATELY 1.6 MM (SEE COMMENT):     Comment: The broad shave specimen is sectioned into  five portions and reveals a broad severely  atypical compound melanocytic proliferation with areas of tumor regression. The invasive component extends focally to the deep margin at a peripheral margin. See synoptic summary below.     Electronically Signed Out by KENY LOPEZ MD.     Addendum Diagnosis   TEST: BRAF Exon 15 Sequencing   SPECIMEN: FFPE, SKIN LEFT UPPER BACK, [id number] C10   DISEASE DIAGNOSIS: Melanoma   Estimated Tumor Content: 40%   COLLECTION  DATE: 3/6/2023   RECEIVED DATE: 3/20/2023   REPORT DATE: 03/24/2023    RESULT:   BRAF p.V600E ([id number])      INTERPRETATION AND POTENTIAL THERAPEUTIC OPTIONS:   BRAF p.V600E   BRAF V600E mutations are associated with sensitivity to BRAF and MEK   inhibitors.      Assessment and Plan:   Assessment/Plan   Mr. Gabriel Cowan is a 73 y.o. male with a diagnosis of melanoma IIIC. BRAF positive. Please see the evolution of the case listed above in the cancer history.      He continues to do well. No new issues reported this visit. CT scan is unremarkable. Continue with therapy and surveillance.      # Melanoma IIIC  - Continue Keytruda at 400mg IV. Cycle 8 today.   - RTC in 6 weeks on 04/04/2024.  - Labs  prior 04/02/2024.  - CT scan 04/16/2024 after treatment is completed.   - Continue bobby basin u/s with surgical oncology. Last 10/17/2023.  - Continue skin checks with dermatology. Next visit 03/26/2024.     # CAD/HTN  - Follows with cardiology and his PCP.      DISCLAIMER:   In preparing for this visit and writing this note, I reviewed all the previous electronic medical records (labs, imaging and medical charts) of the patient available in the physician portal. Significant findings which helped in decision making are recorded  in this chart.     The plan was discussed with the patient. We gave him ample opportunities to ask questions. All questions were answered to his satisfaction and he verbalized understanding.

## 2024-02-22 NOTE — PATIENT INSTRUCTIONS
Mr. Gabriel Cowan ,  It was a pleasure talking to you today.    As discussed, this is the plan for you.   Next Keytruda is on 04/04/2024  Labs prior on 04/02/2024  Visit with us on 04/04/2024 for pre-treatment check    Please be advised that you are receiving immunotherapy in form of checkpoint inhibitors. Like we have discussed earlier, and as also listed in the reading material we provided you, these medications have their own unique side effects. We will test your blood prior to each infusion to determine if your organs are working fine and if it is safe to give you treatment. Many symptoms cannot be tested through lab work and are usually manifestations like diarrhea, shortness of breath, fatigue, muscle aches, fogginess of brain, skin rashes, etc. Please let us know if you experience anything out of ordinary and we will be happy to assist you in managing this condition. If at any time you feel that your condition is quite bad and you cannot wait for our office to return the call then please go to the Emergency Room. Please always make sure that all the clinicians involved in your care are aware that you are receiving checkpoint inhibitors.     Our offices will be reaching out to you to make all the appointments. I am always available at the phone numbers listed below for an earlier appointment should you wish one.    In case of an emergency please dial 911 or report to your nearest Emergency Room.  For all other questions, please do not hesitate to reach out to us at the number listed below.    Thank you for choosing [facility] at [facility].   We appreciate your visit.     MD Brisa Landeros, DEMETRIO Mccormick RN    Sarcoma and Cutaneous Oncology team    Phone: [phone]  Fax: [phone].

## 2024-03-26 ENCOUNTER — APPOINTMENT (OUTPATIENT)
Dept: DERMATOLOGY | Facility: CLINIC | Age: 74
End: 2024-03-26
Payer: MEDICARE

## 2024-04-02 ENCOUNTER — LAB (OUTPATIENT)
Dept: LAB | Facility: CLINIC | Age: 74
End: 2024-04-02
Payer: MEDICARE

## 2024-04-02 DIAGNOSIS — T73.2XXD FATIGUE DUE TO EXPOSURE, SUBSEQUENT ENCOUNTER: ICD-10-CM

## 2024-04-02 DIAGNOSIS — C43.59 MALIGNANT MELANOMA OF UPPER BACK (MULTI): ICD-10-CM

## 2024-04-02 DIAGNOSIS — C77.9 MELANOMA METASTATIC TO LYMPH NODE (MULTI): ICD-10-CM

## 2024-04-02 DIAGNOSIS — Z51.12 ENCOUNTER FOR ANTINEOPLASTIC IMMUNOTHERAPY: ICD-10-CM

## 2024-04-02 LAB
ALBUMIN SERPL BCP-MCNC: 4.4 G/DL (ref 3.4–5)
ALP SERPL-CCNC: 78 U/L (ref 33–136)
ALT SERPL W P-5'-P-CCNC: 26 U/L (ref 10–52)
ANION GAP SERPL CALC-SCNC: 9 MMOL/L (ref 10–20)
AST SERPL W P-5'-P-CCNC: 21 U/L (ref 9–39)
BASOPHILS # BLD AUTO: 0.04 X10*3/UL (ref 0–0.1)
BASOPHILS NFR BLD AUTO: 0.5 %
BILIRUB SERPL-MCNC: 0.7 MG/DL (ref 0–1.2)
BUN SERPL-MCNC: 12 MG/DL (ref 6–23)
CALCIUM SERPL-MCNC: 9.2 MG/DL (ref 8.6–10.3)
CHLORIDE SERPL-SCNC: 105 MMOL/L (ref 98–107)
CO2 SERPL-SCNC: 29 MMOL/L (ref 21–32)
CORTIS AM PEAK SERPL-MSCNC: 11.1 UG/DL (ref 5–20)
CREAT SERPL-MCNC: 0.73 MG/DL (ref 0.5–1.3)
EGFRCR SERPLBLD CKD-EPI 2021: >90 ML/MIN/1.73M*2
EOSINOPHIL # BLD AUTO: 0.07 X10*3/UL (ref 0–0.4)
EOSINOPHIL NFR BLD AUTO: 0.9 %
ERYTHROCYTE [DISTWIDTH] IN BLOOD BY AUTOMATED COUNT: 13.8 % (ref 11.5–14.5)
GGT SERPL-CCNC: 13 U/L (ref 5–64)
GLUCOSE SERPL-MCNC: 105 MG/DL (ref 74–99)
HCT VFR BLD AUTO: 47.2 % (ref 41–52)
HGB BLD-MCNC: 15.6 G/DL (ref 13.5–17.5)
IMM GRANULOCYTES # BLD AUTO: 0.01 X10*3/UL (ref 0–0.5)
IMM GRANULOCYTES NFR BLD AUTO: 0.1 % (ref 0–0.9)
LDH SERPL L TO P-CCNC: 166 U/L (ref 84–246)
LYMPHOCYTES # BLD AUTO: 1.84 X10*3/UL (ref 0.8–3)
LYMPHOCYTES NFR BLD AUTO: 23.9 %
MCH RBC QN AUTO: 31.8 PG (ref 26–34)
MCHC RBC AUTO-ENTMCNC: 33.1 G/DL (ref 32–36)
MCV RBC AUTO: 96 FL (ref 80–100)
MONOCYTES # BLD AUTO: 0.73 X10*3/UL (ref 0.05–0.8)
MONOCYTES NFR BLD AUTO: 9.5 %
NEUTROPHILS # BLD AUTO: 5.02 X10*3/UL (ref 1.6–5.5)
NEUTROPHILS NFR BLD AUTO: 65.1 %
PLATELET # BLD AUTO: 160 X10*3/UL (ref 150–450)
POTASSIUM SERPL-SCNC: 4.2 MMOL/L (ref 3.5–5.3)
PROT SERPL-MCNC: 6.6 G/DL (ref 6.4–8.2)
RBC # BLD AUTO: 4.9 X10*6/UL (ref 4.5–5.9)
SODIUM SERPL-SCNC: 139 MMOL/L (ref 136–145)
TSH SERPL-ACNC: 2.47 MIU/L (ref 0.44–3.98)
WBC # BLD AUTO: 7.7 X10*3/UL (ref 4.4–11.3)

## 2024-04-02 PROCEDURE — 83615 LACTATE (LD) (LDH) ENZYME: CPT

## 2024-04-02 PROCEDURE — 82977 ASSAY OF GGT: CPT

## 2024-04-02 PROCEDURE — 85025 COMPLETE CBC W/AUTO DIFF WBC: CPT

## 2024-04-02 PROCEDURE — 36415 COLL VENOUS BLD VENIPUNCTURE: CPT

## 2024-04-02 PROCEDURE — 84443 ASSAY THYROID STIM HORMONE: CPT

## 2024-04-02 PROCEDURE — 80053 COMPREHEN METABOLIC PANEL: CPT

## 2024-04-02 PROCEDURE — 82533 TOTAL CORTISOL: CPT

## 2024-04-04 ENCOUNTER — INFUSION (OUTPATIENT)
Dept: HEMATOLOGY/ONCOLOGY | Facility: CLINIC | Age: 74
End: 2024-04-04
Payer: MEDICARE

## 2024-04-04 ENCOUNTER — OFFICE VISIT (OUTPATIENT)
Dept: HEMATOLOGY/ONCOLOGY | Facility: CLINIC | Age: 74
End: 2024-04-04
Payer: MEDICARE

## 2024-04-04 VITALS
OXYGEN SATURATION: 94 % | WEIGHT: 158.73 LBS | SYSTOLIC BLOOD PRESSURE: 138 MMHG | DIASTOLIC BLOOD PRESSURE: 82 MMHG | TEMPERATURE: 97.7 F | BODY MASS INDEX: 27.25 KG/M2 | RESPIRATION RATE: 16 BRPM | HEART RATE: 70 BPM

## 2024-04-04 DIAGNOSIS — C43.59 MALIGNANT MELANOMA OF UPPER BACK (MULTI): Primary | ICD-10-CM

## 2024-04-04 DIAGNOSIS — C43.59 MALIGNANT MELANOMA OF UPPER BACK (MULTI): ICD-10-CM

## 2024-04-04 PROCEDURE — 1159F MED LIST DOCD IN RCRD: CPT | Performed by: NURSE PRACTITIONER

## 2024-04-04 PROCEDURE — 1126F AMNT PAIN NOTED NONE PRSNT: CPT | Performed by: NURSE PRACTITIONER

## 2024-04-04 PROCEDURE — 96413 CHEMO IV INFUSION 1 HR: CPT

## 2024-04-04 PROCEDURE — 3075F SYST BP GE 130 - 139MM HG: CPT | Performed by: NURSE PRACTITIONER

## 2024-04-04 PROCEDURE — 2500000004 HC RX 250 GENERAL PHARMACY W/ HCPCS (ALT 636 FOR OP/ED): Mod: JZ,JG | Performed by: NURSE PRACTITIONER

## 2024-04-04 PROCEDURE — 3079F DIAST BP 80-89 MM HG: CPT | Performed by: NURSE PRACTITIONER

## 2024-04-04 PROCEDURE — 99215 OFFICE O/P EST HI 40 MIN: CPT | Performed by: NURSE PRACTITIONER

## 2024-04-04 RX ORDER — FAMOTIDINE 10 MG/ML
20 INJECTION INTRAVENOUS ONCE AS NEEDED
Status: CANCELLED | OUTPATIENT
Start: 2024-04-04

## 2024-04-04 RX ORDER — EPINEPHRINE 0.3 MG/.3ML
0.3 INJECTION SUBCUTANEOUS EVERY 5 MIN PRN
Status: DISCONTINUED | OUTPATIENT
Start: 2024-04-04 | End: 2024-04-04 | Stop reason: HOSPADM

## 2024-04-04 RX ORDER — PROCHLORPERAZINE EDISYLATE 5 MG/ML
10 INJECTION INTRAMUSCULAR; INTRAVENOUS EVERY 6 HOURS PRN
Status: CANCELLED | OUTPATIENT
Start: 2024-04-04

## 2024-04-04 RX ORDER — ALBUTEROL SULFATE 0.83 MG/ML
3 SOLUTION RESPIRATORY (INHALATION) AS NEEDED
Status: CANCELLED | OUTPATIENT
Start: 2024-04-04

## 2024-04-04 RX ORDER — PROCHLORPERAZINE EDISYLATE 5 MG/ML
10 INJECTION INTRAMUSCULAR; INTRAVENOUS EVERY 6 HOURS PRN
Status: DISCONTINUED | OUTPATIENT
Start: 2024-04-04 | End: 2024-04-04 | Stop reason: HOSPADM

## 2024-04-04 RX ORDER — DIPHENHYDRAMINE HYDROCHLORIDE 50 MG/ML
50 INJECTION INTRAMUSCULAR; INTRAVENOUS AS NEEDED
Status: CANCELLED | OUTPATIENT
Start: 2024-04-04

## 2024-04-04 RX ORDER — ALBUTEROL SULFATE 0.83 MG/ML
3 SOLUTION RESPIRATORY (INHALATION) AS NEEDED
Status: DISCONTINUED | OUTPATIENT
Start: 2024-04-04 | End: 2024-04-04 | Stop reason: HOSPADM

## 2024-04-04 RX ORDER — DIPHENHYDRAMINE HYDROCHLORIDE 50 MG/ML
50 INJECTION INTRAMUSCULAR; INTRAVENOUS AS NEEDED
Status: DISCONTINUED | OUTPATIENT
Start: 2024-04-04 | End: 2024-04-04 | Stop reason: HOSPADM

## 2024-04-04 RX ORDER — EPINEPHRINE 0.3 MG/.3ML
0.3 INJECTION SUBCUTANEOUS EVERY 5 MIN PRN
Status: CANCELLED | OUTPATIENT
Start: 2024-04-04

## 2024-04-04 RX ORDER — FAMOTIDINE 10 MG/ML
20 INJECTION INTRAVENOUS ONCE AS NEEDED
Status: DISCONTINUED | OUTPATIENT
Start: 2024-04-04 | End: 2024-04-04 | Stop reason: HOSPADM

## 2024-04-04 RX ORDER — PROCHLORPERAZINE MALEATE 5 MG
10 TABLET ORAL EVERY 6 HOURS PRN
Status: CANCELLED | OUTPATIENT
Start: 2024-04-04

## 2024-04-04 RX ORDER — PROCHLORPERAZINE MALEATE 10 MG
10 TABLET ORAL EVERY 6 HOURS PRN
Status: DISCONTINUED | OUTPATIENT
Start: 2024-04-04 | End: 2024-04-04 | Stop reason: HOSPADM

## 2024-04-04 RX ADMIN — SODIUM CHLORIDE 400 MG: 9 INJECTION, SOLUTION INTRAVENOUS at 14:57

## 2024-04-04 ASSESSMENT — ENCOUNTER SYMPTOMS
RESPIRATORY NEGATIVE: 1
PSYCHIATRIC NEGATIVE: 1
GASTROINTESTINAL NEGATIVE: 1
CARDIOVASCULAR NEGATIVE: 1
CONSTITUTIONAL NEGATIVE: 1
EYES NEGATIVE: 1
ENDOCRINE NEGATIVE: 1
HEMATOLOGIC/LYMPHATIC NEGATIVE: 1
NEUROLOGICAL NEGATIVE: 1
ARTHRALGIAS: 1

## 2024-04-04 ASSESSMENT — PAIN SCALES - GENERAL: PAINLEVEL: 0-NO PAIN

## 2024-04-04 NOTE — PATIENT INSTRUCTIONS
Mr. Gabriel Cowan ,  It was a pleasure talking to you today.    Congratulations on completing treatment! We will meet again 04/18/2024. Please have a CT scan prior 04/16/2024. Please continue to follow up with dermatology and surgical oncology.     Our offices will be reaching out to you to make all the appointments. I am always available at the phone numbers listed below for an earlier appointment should you wish one.    In case of an emergency please dial 911 or report to your nearest Emergency Room.  For all other questions, please do not hesitate to reach out to us at the number listed below.    Thank you for choosing [facility] at [facility].   We appreciate your visit.     MD Brisa Landeros, DEMETRIO Mccormick RN    Sarcoma and Cutaneous Oncology team    Phone: [phone]  Fax: [phone].

## 2024-04-04 NOTE — PROGRESS NOTES
".Patient ID: Gabriel Cowan is a 73 y.o. male.  Referring Physician: Christopher Iglesias MD  [address] [place] [address]  Primary Care Provider: Montana Hernandez DO     Oncology follow up     HPI   Follow-up    Melanoma       Follow up imaging results and immunotherapy.    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 He met with our team on 4/6/23.      Referring Surgeon: Dr. Kp Kilgore  Dermatologist: Yin Cano DO   Date of first meeting with our team: 04/06/2023     Date of First meeting with surgical team: 02/14/2023  Melanoma type: Cutaneous- Superficial Spreading type  Location of primary site: Left Upper back  Date of WLE and SLNB: 3/6/23  Final pathology: Breslow Depth- 1.6 mm; Ulceration status- Negative; LVI- No; Other high risk features- Microsatelitosis present.  [place] lymph node status: 2/2 positive. Largest deposit 9mm. No JUAN.  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 WLE and SLNB completed on 3/6/23. He was staged at IIIC melanoma. He started keytruda on 4/27/2023 at 400mg IV dose. Surveillance CT scans 01/09/24 did not show recurrence.     Treatment History:   Systemic therapy.   1. Keytruda 400mg (adjuvant setting)  C1- 04/27/2023, C2- 06/08/2023, C3- 07/20/2023, C4- 08/31/2023, C5- 10/12/2023, C6- 11/30/2023  C7- 01/11/2024, C8- 02/22/2024, C9- 04/04/2024    Subjective   Please refer to \"Notes/Cancer History\" above for complete History of present illness.     Mr Gabriel Cowan     - Presents to clinic alone.   - Tolerating immunotherapy well.   - Continues to walk 8 miles, 5 days a week. Walking his dog yesterday and another dog ran into his right knee, and caused some pain. He is feeling better today. No edema.      Review " of Systems:   Review of Systems   Constitutional: Negative.    HENT:  Negative.     Eyes: Negative.    Respiratory: Negative.     Cardiovascular: Negative.    Gastrointestinal: Negative.    Endocrine: Negative.    Genitourinary: Negative.     Musculoskeletal:  Positive for arthralgias.        Knee pain   Skin: Negative.    Neurological: Negative.    Hematological: Negative.    Psychiatric/Behavioral: Negative.           MEDICAL HISTORY  Past Medical History:   Diagnosis Date    CAD (coronary artery disease)     HTN (hypertension)     Malignant melanoma of skin of back (CMS/HCC)     Melanoma metastatic to lymph node (CMS/HCC)        FAMILY HISTORY  Family History   Problem Relation Name Age of Onset    No Known Problems Mother      No Known Problems Father      No Known Problems Sister      No Known Problems Brother         TOBACCO HISTORY  Tobacco Use: High Risk (1/11/2024)    Patient History     Smoking Tobacco Use: Some Days     Smokeless Tobacco Use: Never     Passive Exposure: Never       SOCIAL HISTORY  Social Connections: Not on file   Retired ER physician, , lives with his wife     Outpatient Medication Profile:  Current Outpatient Medications on File Prior to Visit   Medication Sig Dispense Refill    atorvastatin (Lipitor) 80 mg tablet Take 1 tablet (80 mg) by mouth once daily. 90 tablet 3    ezetimibe (Zetia) 10 mg tablet Take 1 tablet (10 mg) by mouth once daily at bedtime. 90 tablet 3    lisinopril 20 mg tablet Take 1 tablet (20 mg) by mouth once daily. 90 tablet 3    metoprolol succinate XL (Toprol-XL) 100 mg 24 hr tablet Take 1 tablet (100 mg) by mouth once daily. Do not crush or chew. 90 tablet 3    pembrolizumab (Keytruda) 25 mg/mL chemo injection Infuse into a venous catheter 1 time.       No current facility-administered medications on file prior to visit.         Performance Status:  Asymptomatic     Vitals and Measurements:   There were no vitals taken for this visit.   Vitals:    04/04/24  1351   BP: 138/82   Pulse: 70   Resp: 16   Temp: 36.5 °C (97.7 °F)   TempSrc: Temporal   SpO2: 94%   Weight: 72 kg (158 lb 11.7 oz)   PainSc: 0-No pain     Physical Exam:   Physical Exam  Constitutional:       Appearance: Normal appearance.   HENT:      Head: Normocephalic and atraumatic.      Nose: Nose normal.      Mouth/Throat:      Mouth: Mucous membranes are moist.      Pharynx: Oropharynx is clear.   Eyes:      Conjunctiva/sclera: Conjunctivae normal.   Cardiovascular:      Rate and Rhythm: Normal rate and regular rhythm.      Pulses: Normal pulses.      Heart sounds: Normal heart sounds.   Pulmonary:      Effort: Pulmonary effort is normal.      Breath sounds: Normal breath sounds.   Abdominal:      General: Bowel sounds are normal.      Palpations: Abdomen is soft.   Musculoskeletal:         General: Normal range of motion.      Cervical back: Neck supple.   Skin:     General: Skin is warm and dry.      Comments: Well healed incision on back without nodules    Neurological:      General: No focal deficit present.      Mental Status: He is alert and oriented to person, place, and time.   Psychiatric:         Mood and Affect: Mood normal.         Behavior: Behavior normal.      Lab Results:  I have reviewed these laboratory results:     Lab Results   Component Value Date    WBC 7.7 04/02/2024    HGB 15.6 04/02/2024    HCT 47.2 04/02/2024    MCV 96 04/02/2024     04/02/2024         Chemistry    Lab Results   Component Value Date/Time     04/02/2024 1023    K 4.2 04/02/2024 1023     04/02/2024 1023    CO2 29 04/02/2024 1023    BUN 12 04/02/2024 1023    CREATININE 0.73 04/02/2024 1023    Lab Results   Component Value Date/Time    CALCIUM 9.2 04/02/2024 1023    ALKPHOS 78 04/02/2024 1023    AST 21 04/02/2024 1023    ALT 26 04/02/2024 1023    BILITOT 0.7 04/02/2024 1023            Lab Results   Component Value Date    TSH 2.47 04/02/2024        Radiology Result:  I have reviewed the latest  Imaging in PACS and the findings are noted in this note. I discussed the results of the latest imaging with the patient. All previous imaging were reviewed at the time it was completed. Full records are available in the EMR for review as well.     CT CHEST ABDOMEN PELVIS W IV CONTRAST; 1/9/2024 11:21 am   IMPRESSION:  CHEST  1.  Fluid in the esophagus may reflect reflux. No acute  cardiopulmonary process.      ABDOMEN - PELVIS  1.  No acute intra-abdominal process. No change from prior examination      === 04/04/23 ===    MR BRAIN W AND WO CONTRAST    - Impression -  No evidence of intracranial metastatic disease.    Focal hyperintense signal with enhancement within the right frontal  calvarium may represent small hemangiomas versus a prominent venous  Lakes. Consider noncontrast CT of the head to further characterize  and attention on follow-up exams.    Mild degree of nonspecific white matter signal compatible with  microangiopathy.    Pathology Results:  I have reviewed the full pathology report recorded in the EMR. The pertinent portions indicating diagnosis are listed here in the note. for details please refer to the full report recorded in the EMR.    I have reviewed the full pathology report recorded in the EMR. The pertinent portions indicating diagnosis are listed here in the note. for details please refer to the full report recorded in the EMR.     Dermatopathology [Jan 26 2023 4:25PM] ([id number])  Specimens: SKIN, L LATERAL BACK   Name JUNIE ROSA     Accession  #: [id number]   Pathologist: KENY LOPEZ MD   Date ofProcedure: 1/24/2023   Date Received: 1/25/2023   Date Reported 1/26/2023   Submitting Physician: BRANDO MOORE DO   Location: [facility] Other External #     FINAL  DIAGNOSIS   SKIN, L LATERAL BACK, SHAVE BIOPSY:   INVASIVE MALIGNANT MELANOMA, EXTENDING TO A DEPTH OF APPROXIMATELY 1.6 MM (SEE COMMENT):     Comment: The broad shave specimen is sectioned into five portions and reveals a  broad severely  atypical compound melanocytic proliferation with areas of tumor regression. The invasive component extends focally to the deep margin at a peripheral margin. See synoptic summary below.     Electronically Signed Out by KENY LOPEZ MD.     Addendum Diagnosis   TEST: BRAF Exon 15 Sequencing   SPECIMEN: FFPE, SKIN LEFT UPPER BACK, [id number] C10   DISEASE DIAGNOSIS: Melanoma   Estimated Tumor Content: 40%   COLLECTION  DATE: 3/6/2023   RECEIVED DATE: 3/20/2023   REPORT DATE: 03/24/2023    RESULT:   BRAF p.V600E ([id number])      INTERPRETATION AND POTENTIAL THERAPEUTIC OPTIONS:   BRAF p.V600E   BRAF V600E mutations are associated with sensitivity to BRAF and MEK   inhibitors.      Assessment and Plan:   Assessment/Plan   Mr. Gabriel Cowan is a 73 y.o. male with a diagnosis of melanoma IIIC on the back. WLE and SLNB 03/06/2023. BRAF positive. Completed one year (9 cycles) of Keytruda 04/04/2024. Please see the evolution of the case listed above in the cancer history.      # Melanoma IIIC  - Continue Keytruda at 400mg IV. Cycle 9 today.   - RTC 04/18/2024.  - CT scan 04/16/2024.   - Continue bobby basin u/s with surgical oncology. Next 04/23/2024.  - Continue skin checks with dermatology. Next visit 07/09/2024.     # CAD/HTN  - Follows with cardiology and his PCP.      DISCLAIMER:   In preparing for this visit and writing this note, I reviewed all the previous electronic medical records (labs, imaging and medical charts) of the patient available in the physician portal. Significant findings which helped in decision making are recorded  in this chart.     The plan was discussed with the patient. We gave him ample opportunities to ask questions. All questions were answered to his satisfaction and he verbalized understanding.

## 2024-04-16 ENCOUNTER — HOSPITAL ENCOUNTER (OUTPATIENT)
Dept: RADIOLOGY | Facility: HOSPITAL | Age: 74
Discharge: HOME | End: 2024-04-16
Payer: MEDICARE

## 2024-04-16 DIAGNOSIS — C43.59 MALIGNANT MELANOMA OF UPPER BACK (MULTI): ICD-10-CM

## 2024-04-16 PROCEDURE — 74177 CT ABD & PELVIS W/CONTRAST: CPT | Performed by: STUDENT IN AN ORGANIZED HEALTH CARE EDUCATION/TRAINING PROGRAM

## 2024-04-16 PROCEDURE — 74177 CT ABD & PELVIS W/CONTRAST: CPT

## 2024-04-16 PROCEDURE — 71260 CT THORAX DX C+: CPT | Performed by: STUDENT IN AN ORGANIZED HEALTH CARE EDUCATION/TRAINING PROGRAM

## 2024-04-16 PROCEDURE — 2550000001 HC RX 255 CONTRASTS: Performed by: NURSE PRACTITIONER

## 2024-04-16 RX ADMIN — IOHEXOL 75 ML: 350 INJECTION, SOLUTION INTRAVENOUS at 10:22

## 2024-04-18 ENCOUNTER — OFFICE VISIT (OUTPATIENT)
Dept: HEMATOLOGY/ONCOLOGY | Facility: CLINIC | Age: 74
End: 2024-04-18
Payer: MEDICARE

## 2024-04-18 VITALS
RESPIRATION RATE: 14 BRPM | SYSTOLIC BLOOD PRESSURE: 143 MMHG | DIASTOLIC BLOOD PRESSURE: 80 MMHG | TEMPERATURE: 96.8 F | BODY MASS INDEX: 27.45 KG/M2 | OXYGEN SATURATION: 97 % | HEART RATE: 55 BPM | WEIGHT: 159.94 LBS

## 2024-04-18 DIAGNOSIS — C43.59 MALIGNANT MELANOMA OF UPPER BACK (MULTI): ICD-10-CM

## 2024-04-18 DIAGNOSIS — Z92.89 HISTORY OF IMMUNOTHERAPY: Primary | ICD-10-CM

## 2024-04-18 PROCEDURE — 1126F AMNT PAIN NOTED NONE PRSNT: CPT | Performed by: NURSE PRACTITIONER

## 2024-04-18 PROCEDURE — 99215 OFFICE O/P EST HI 40 MIN: CPT | Performed by: NURSE PRACTITIONER

## 2024-04-18 PROCEDURE — 3077F SYST BP >= 140 MM HG: CPT | Performed by: NURSE PRACTITIONER

## 2024-04-18 PROCEDURE — 1159F MED LIST DOCD IN RCRD: CPT | Performed by: NURSE PRACTITIONER

## 2024-04-18 PROCEDURE — 3079F DIAST BP 80-89 MM HG: CPT | Performed by: NURSE PRACTITIONER

## 2024-04-18 ASSESSMENT — ENCOUNTER SYMPTOMS
NEUROLOGICAL NEGATIVE: 1
CONSTITUTIONAL NEGATIVE: 1
HEMATOLOGIC/LYMPHATIC NEGATIVE: 1
GASTROINTESTINAL NEGATIVE: 1
ENDOCRINE NEGATIVE: 1
RESPIRATORY NEGATIVE: 1
PSYCHIATRIC NEGATIVE: 1
CARDIOVASCULAR NEGATIVE: 1
EYES NEGATIVE: 1

## 2024-04-18 ASSESSMENT — PAIN SCALES - GENERAL: PAINLEVEL: 0-NO PAIN

## 2024-04-18 NOTE — PATIENT INSTRUCTIONS
Mr. Gabriel Cowan ,  It was a pleasure talking to you today.    Congratulations on completing treatment! We will meet again 07/18/2024. Please have labs 07/15/2024 and a CT scan prior 07/16/2024 at [facility]. Please continue to follow up with dermatology and surgical oncology.     Our offices will be reaching out to you to make all the appointments. I am always available at the phone numbers listed below for an earlier appointment should you wish one.    In case of an emergency please dial 911 or report to your nearest Emergency Room.  For all other questions, please do not hesitate to reach out to us at the number listed below.    Thank you for choosing [facility] at [facility].   We appreciate your visit.     MD Brisa Landeros, DEMETRIO Mccormick RN    Sarcoma and Cutaneous Oncology team    Phone: [phone]  Fax: [phone].

## 2024-04-18 NOTE — PROGRESS NOTES
".Patient ID: Gabriel Cowan is a 73 y.o. male.  Referring Physician: Brisa Walden, APRN-CNP  [id number] [place] [address]  Primary Care Provider: Montana Hernandez DO     Oncology follow up     HPI   Follow-up    Melanoma       Follow up imaging results and immunotherapy.    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 He met with our team on 4/6/23.      Referring Surgeon: Dr. Kp Kilgore  Dermatologist: Yin Cano DO   Date of first meeting with our team: 04/06/2023     Date of First meeting with surgical team: 02/14/2023  Melanoma type: Cutaneous- Superficial Spreading type  Location of primary site: Left Upper back  Date of WLE and SLNB: 3/6/23  Final pathology: Breslow Depth- 1.6 mm; Ulceration status- Negative; LVI- No; Other high risk features- Microsatelitosis present.  [place] lymph node status: 2/2 positive. Largest deposit 9mm. No JUAN.  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 WLE and SLNB completed on 3/6/23. He was staged at IIIC melanoma. He started keytruda on 4/27/2023 at 400mg IV dose. Surveillance CT scans 01/09/24 did not show recurrence. Completed one year of Keytruda 04/04/2024 and is currently on surveillance.      Treatment History:   Systemic therapy.   1. Keytruda 400mg (adjuvant setting)  C1- 04/27/2023, C2- 06/08/2023, C3- 07/20/2023, C4- 08/31/2023, C5- 10/12/2023, C6- 11/30/2023  C7- 01/11/2024, C8- 02/22/2024, C9- 04/04/2024    Subjective   Please refer to \"Notes/Cancer History\" above for complete History of present illness.     Mr Gabriel Cowan     - Presents to clinic alone.   - Happy to have completed immunotherapy.   - Continues to walk 8 miles 5 days a week and enjoy time with his grandson.  - " Continues skin checks with dermatology and bobby basin ultrasound with surgical oncology.      Review of Systems:   Review of Systems   Constitutional: Negative.    HENT:  Negative.     Eyes: Negative.    Respiratory: Negative.     Cardiovascular: Negative.    Gastrointestinal: Negative.    Endocrine: Negative.    Genitourinary: Negative.     Skin: Negative.    Neurological: Negative.    Hematological: Negative.    Psychiatric/Behavioral: Negative.           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)        FAMILY HISTORY  Family History   Problem Relation Name Age of Onset    No Known Problems Mother      No Known Problems Father      No Known Problems Sister      No Known Problems Brother         TOBACCO HISTORY  Tobacco Use: High Risk (4/18/2024)    Patient History     Smoking Tobacco Use: Some Days     Smokeless Tobacco Use: Never     Passive Exposure: Never       SOCIAL HISTORY  Social Connections: Not on file   Retired ER physician, , lives with his wife     Outpatient Medication Profile:  Current Outpatient Medications on File Prior to Visit   Medication Sig Dispense Refill    atorvastatin (Lipitor) 80 mg tablet Take 1 tablet (80 mg) by mouth once daily. 90 tablet 3    ezetimibe (Zetia) 10 mg tablet Take 1 tablet (10 mg) by mouth once daily at bedtime. 90 tablet 3    lisinopril 20 mg tablet Take 1 tablet (20 mg) by mouth once daily. 90 tablet 3    metoprolol succinate XL (Toprol-XL) 100 mg 24 hr tablet Take 1 tablet (100 mg) by mouth once daily. Do not crush or chew. 90 tablet 3    pembrolizumab (Keytruda) 25 mg/mL chemo injection Infuse into a venous catheter 1 time.       No current facility-administered medications on file prior to visit.         Performance Status:  Asymptomatic     Vitals and Measurements:   /80 (BP Location: Left  arm, Patient Position: Sitting)   Pulse 55   Temp 36 °C (96.8 °F) (Temporal)   Resp 14   Wt 72.6 kg (159 lb 15.1 oz)   SpO2 97%   BMI 27.45 kg/m²    Vitals:    04/18/24 0947   BP: 143/80   Pulse: 55   Resp: 14   Temp: 36 °C (96.8 °F)   TempSrc: Temporal   SpO2: 97%   Weight: 72.6 kg (159 lb 15.1 oz)   PainSc: 0-No pain     Physical Exam:   Physical Exam  Constitutional:       Appearance: Normal appearance.   HENT:      Head: Normocephalic and atraumatic.      Nose: Nose normal.      Mouth/Throat:      Mouth: Mucous membranes are moist.      Pharynx: Oropharynx is clear.   Eyes:      Conjunctiva/sclera: Conjunctivae normal.   Cardiovascular:      Rate and Rhythm: Normal rate and regular rhythm.      Pulses: Normal pulses.      Heart sounds: Normal heart sounds.   Pulmonary:      Effort: Pulmonary effort is normal.      Breath sounds: Normal breath sounds.   Abdominal:      General: Bowel sounds are normal.      Palpations: Abdomen is soft.   Musculoskeletal:         General: Normal range of motion.      Cervical back: Neck supple.   Skin:     General: Skin is warm and dry.      Comments: Well healed incision on back without nodules    Neurological:      General: No focal deficit present.      Mental Status: He is alert and oriented to person, place, and time.   Psychiatric:         Mood and Affect: Mood normal.         Behavior: Behavior normal.      Lab Results:  I have reviewed these laboratory results:     Lab Results   Component Value Date    WBC 7.7 04/02/2024    HGB 15.6 04/02/2024    HCT 47.2 04/02/2024    MCV 96 04/02/2024     04/02/2024         Chemistry    Lab Results   Component Value Date/Time     04/02/2024 1023    K 4.2 04/02/2024 1023     04/02/2024 1023    CO2 29 04/02/2024 1023    BUN 12 04/02/2024 1023    CREATININE 0.73 04/02/2024 1023    Lab Results   Component Value Date/Time    CALCIUM 9.2 04/02/2024 1023    ALKPHOS 78 04/02/2024 1023    AST 21 04/02/2024 1023    ALT 26  04/02/2024 1023    BILITOT 0.7 04/02/2024 1023            Lab Results   Component Value Date    TSH 2.47 04/02/2024        Radiology Result:  I have reviewed the latest Imaging in PACS and the findings are noted in this note. I discussed the results of the latest imaging with the patient. All previous imaging were reviewed at the time it was completed. Full records are available in the EMR for review as well.     CT CHEST ABDOMEN PELVIS W IV CONTRAST; 4/16/2024 10:21 am     IMPRESSION:  CHEST:  1. Left axillary/chest wall lymph node dissection/intervention with  no recurrence discrete soft tissue masses or enlarged lymphadenopathy.  2. No suspicious pulmonary nodules.  3. Severe coronary artery calcifications      ABDOMEN-PELVIS:  1. No intraabdominal or pelvic metastatic disease.      === 04/04/23 ===  MR BRAIN W AND WO CONTRAST    - Impression -  No evidence of intracranial metastatic disease.    Focal hyperintense signal with enhancement within the right frontal  calvarium may represent small hemangiomas versus a prominent venous  [place]. Consider noncontrast CT of the head to further characterize  and attention on follow-up exams.    Mild degree of nonspecific white matter signal compatible with  microangiopathy.    Pathology Results:  I have reviewed the full pathology report recorded in the EMR. The pertinent portions indicating diagnosis are listed here in the note. for details please refer to the full report recorded in the EMR.    I have reviewed the full pathology report recorded in the EMR. The pertinent portions indicating diagnosis are listed here in the note. for details please refer to the full report recorded in the EMR.     Dermatopathology [Jan 26 2023 4:25PM] ([id number])  Specimens: SKIN, L LATERAL BACK   Name JUNIE ROSA     Accession  #: [id number]   Pathologist: KENY LOPEZ MD   Date ofProcedure: 1/24/2023   Date Received: 1/25/2023   Date Reported 1/26/2023   Submitting  Physician: BRANDO MOORE DO   Location: [facility] Other External #     FINAL  DIAGNOSIS   SKIN, L LATERAL BACK, SHAVE BIOPSY:   INVASIVE MALIGNANT MELANOMA, EXTENDING TO A DEPTH OF APPROXIMATELY 1.6 MM (SEE COMMENT):     Comment: The broad shave specimen is sectioned into five portions and reveals a broad severely  atypical compound melanocytic proliferation with areas of tumor regression. The invasive component extends focally to the deep margin at a peripheral margin. See synoptic summary below.     Electronically Signed Out by KENY LOPEZ MD.     Addendum Diagnosis   TEST: BRAF Exon 15 Sequencing   SPECIMEN: FFPE, SKIN LEFT UPPER BACK, [id number] C10   DISEASE DIAGNOSIS: Melanoma   Estimated Tumor Content: 40%   COLLECTION  DATE: 3/6/2023   RECEIVED DATE: 3/20/2023   REPORT DATE: 03/24/2023    RESULT:   BRAF p.V600E ([id number])      INTERPRETATION AND POTENTIAL THERAPEUTIC OPTIONS:   BRAF p.V600E   BRAF V600E mutations are associated with sensitivity to BRAF and MEK   inhibitors.      Assessment and Plan:   Assessment/Plan   Mr. Gabriel Cowan is a 73 y.o. male with a diagnosis of melanoma IIIC on the back. WLE and SLNB 03/06/2023. BRAF positive. Completed one year (9 cycles) of Keytruda 04/04/2024. Please see the evolution of the case listed above in the cancer history.      # Melanoma IIIC  - CT 04/16/2024 showing stable changes and MARBIN. Severe CAD noted. He is following with cardiology.   - Continue surveillance.   - RTC 07/18/2024.  - CT scan 07/16/2024. Labs prior 07/15/2024.  - Continue bobby basin u/s with surgical oncology. Next 04/23/2024.  - Continue skin checks with dermatology. Next visit 07/09/2024.     # CAD/HTN  - Follows with cardiology and his PCP.      DISCLAIMER:   In preparing for this visit and writing this note, I reviewed all the previous electronic medical records (labs, imaging and medical charts) of the patient available in the physician portal. Significant findings which helped  in decision making are recorded  in this chart.     The plan was discussed with the patient. We gave him ample opportunities to ask questions. All questions were answered to his satisfaction and he verbalized understanding.

## 2024-04-23 ENCOUNTER — HOSPITAL ENCOUNTER (OUTPATIENT)
Dept: RADIOLOGY | Facility: HOSPITAL | Age: 74
Discharge: HOME | End: 2024-04-23
Payer: MEDICARE

## 2024-04-23 DIAGNOSIS — C43.59 MALIGNANT MELANOMA OF UPPER BACK (MULTI): ICD-10-CM

## 2024-04-23 PROCEDURE — 76882 US LMTD JT/FCL EVL NVASC XTR: CPT | Performed by: RADIOLOGY

## 2024-04-23 PROCEDURE — 76881 US COMPL JOINT R-T W/IMG: CPT

## 2024-04-29 DIAGNOSIS — C77.9 MELANOMA METASTATIC TO LYMPH NODE (MULTI): ICD-10-CM

## 2024-06-19 DIAGNOSIS — I10 ESSENTIAL (PRIMARY) HYPERTENSION: ICD-10-CM

## 2024-06-19 DIAGNOSIS — E78.5 HYPERLIPIDEMIA, UNSPECIFIED HYPERLIPIDEMIA TYPE: ICD-10-CM

## 2024-06-20 RX ORDER — LISINOPRIL 20 MG/1
20 TABLET ORAL DAILY
Qty: 100 TABLET | Refills: 2 | Status: SHIPPED | OUTPATIENT
Start: 2024-06-20

## 2024-06-20 RX ORDER — ATORVASTATIN CALCIUM 80 MG/1
80 TABLET, FILM COATED ORAL DAILY
Qty: 100 TABLET | Refills: 2 | Status: SHIPPED | OUTPATIENT
Start: 2024-06-20

## 2024-07-09 ENCOUNTER — APPOINTMENT (OUTPATIENT)
Dept: DERMATOLOGY | Facility: CLINIC | Age: 74
End: 2024-07-09
Payer: MEDICARE

## 2024-07-09 ENCOUNTER — HOSPITAL ENCOUNTER (OUTPATIENT)
Dept: CARDIOLOGY | Facility: HOSPITAL | Age: 74
Discharge: HOME | End: 2024-07-09
Payer: MEDICARE

## 2024-07-09 DIAGNOSIS — I25.10 CORONARY ARTERY DISEASE INVOLVING NATIVE CORONARY ARTERY OF NATIVE HEART WITHOUT ANGINA PECTORIS: ICD-10-CM

## 2024-07-09 DIAGNOSIS — L57.8 PHOTOAGING OF SKIN: ICD-10-CM

## 2024-07-09 DIAGNOSIS — Z85.820 PERSONAL HISTORY OF MALIGNANT MELANOMA OF SKIN: Primary | ICD-10-CM

## 2024-07-09 DIAGNOSIS — D18.01 HEMANGIOMA OF SKIN: ICD-10-CM

## 2024-07-09 DIAGNOSIS — L82.1 SEBORRHEIC KERATOSIS: ICD-10-CM

## 2024-07-09 PROCEDURE — 99213 OFFICE O/P EST LOW 20 MIN: CPT | Performed by: DERMATOLOGY

## 2024-07-09 PROCEDURE — 93306 TTE W/DOPPLER COMPLETE: CPT | Performed by: INTERNAL MEDICINE

## 2024-07-09 PROCEDURE — 93306 TTE W/DOPPLER COMPLETE: CPT

## 2024-07-09 PROCEDURE — 1159F MED LIST DOCD IN RCRD: CPT | Performed by: DERMATOLOGY

## 2024-07-09 ASSESSMENT — ITCH NUMERIC RATING SCALE: HOW SEVERE IS YOUR ITCHING?: 0

## 2024-07-09 ASSESSMENT — DERMATOLOGY PATIENT ASSESSMENT
DO YOU USE A TANNING BED: NO
FOR PATIENTS COMING IN FOR A FOLLOW-UP VISIT - HAVE THERE BEEN ANY CHANGES IN YOUR HEALTH SINCE YOUR LAST VISIT: NO
DO YOU HAVE ANY NEW OR CHANGING LESIONS: NO
HAVE YOU HAD OR DO YOU HAVE VASCULAR DISEASE: YES
HAVE YOU HAD OR DO YOU HAVE A STAPH INFECTION: NO
ARE YOU AN ORGAN TRANSPLANT RECIPIENT: NO

## 2024-07-09 ASSESSMENT — DERMATOLOGY QUALITY OF LIFE (QOL) ASSESSMENT
RATE HOW BOTHERED YOU ARE BY SYMPTOMS OF YOUR SKIN PROBLEM (EG, ITCHING, STINGING BURNING, HURTING OR SKIN IRRITATION): 0 - NEVER BOTHERED
RATE HOW EMOTIONALLY BOTHERED YOU ARE BY YOUR SKIN PROBLEM (FOR EXAMPLE, WORRY, EMBARRASSMENT, FRUSTRATION): 0 - NEVER BOTHERED
DATE THE QUALITY-OF-LIFE ASSESSMENT WAS COMPLETED: 67030
WHAT SINGLE SKIN CONDITION LISTED BELOW IS THE PATIENT ANSWERING THE QUALITY-OF-LIFE ASSESSMENT QUESTIONS ABOUT: NONE OF THE ABOVE
ARE THERE EXCLUSIONS OR EXCEPTIONS FOR THE QUALITY OF LIFE ASSESSMENT: NO
RATE HOW BOTHERED YOU ARE BY EFFECTS OF YOUR SKIN PROBLEMS ON YOUR ACTIVITIES (EG, GOING OUT, ACCOMPLISHING WHAT YOU WANT, WORK ACTIVITIES OR YOUR RELATIONSHIPS WITH OTHERS): 0 - NEVER BOTHERED

## 2024-07-09 ASSESSMENT — PATIENT GLOBAL ASSESSMENT (PGA): PATIENT GLOBAL ASSESSMENT: PATIENT GLOBAL ASSESSMENT:  1 - CLEAR

## 2024-07-09 NOTE — PROGRESS NOTES
Subjective     Gabriel Cowan is a 74 y.o. male who presents for the following: Skin Check (Pt here for FBSE with hx of stage IIIC melanoma. Pt reports no areas of concern at this time. ).     Review of Systems:  No other skin or systemic complaints other than what is documented elsewhere in the note.    The following portions of the chart were reviewed this encounter and updated as appropriate:         Skin Cancer History  History of melanoma -Left flank -  Stage IIIC melanoma 3/2023 - WLE + SLNB Breslow 1.6mm on Keytruda 4/2023, CT 4/2024 no recurrence. Completed Keytruda 4/4/2024      Specialty Problems          Dermatology Problems    Hemangioma of skin and subcutaneous tissue    Melanocytic nevi of trunk    Neoplasm of uncertain behavior of skin    Other seborrheic keratosis    Other skin changes due to chronic exposure to nonionizing radiation    Personal history of malignant melanoma of skin    Malignant melanoma of upper back (Multi)    Skin lesion of back    Melanoma metastatic to lymph node (Multi)        Objective   Well appearing patient in no apparent distress; mood and affect are within normal limits.    A full examination was performed including scalp, head, eyes, ears, nose, lips, neck, chest, axillae, abdomen, back, buttocks, bilateral upper extremities, bilateral lower extremities, hands, feet, fingers, toes, fingernails, and toenails. All findings within normal limits unless otherwise noted below.  No axillary, inguinal, head and neck palpable adenopathy on examination today.    Assessment/Plan   1. Personal history of malignant melanoma of skin  Left flank  Well healed scar at site of prior treatment without evidence of recurrence.    There is no evidence of recurrence or repigmentation on clinical examination today, reassure was provided to the patient. The importance of sun protection was reviewed with the patient including the use of a broad spectrum sunscreen that protects against both UVA/UVB  rays, with ingredients such as Zinc oxide or titanium dioxide, wearing sun protective clothing and sun avoidance. ABCDEs of melanoma reviewed. Patient to f/u should they notice any new or changing pre-existing skin lesion. The patient was advised to follow up 6 months for FBSE due to history of melanoma.    Related Procedures  Follow Up In Dermatology - Established Patient    2. Seborrheic keratosis (4)  Left Lower Back (2), Left Upper Back, Right Upper Back  Brown, tan waxy macules and stuck on appearing papules and plaques    The benign nature of these skin lesions reviewed, reassure provided and no further treatment needed at this time.   These lesions can be removed, if symptomatic (itching, bleeding, rubbing on clothing, painful), otherwise removal is considered cosmetic.     3. Photoaging of skin  Mottled pigmentation with telangiectasias and brown reticular macules in sun exposed areas of the body.    The risk of chronic, cumulative sun damage and risk of development of skin cancer was reviewed today.   The importance of sun protection was reviewed: including the use of a broad spectrum sunscreen of at least SPF 30 that protects against both UVA/UVB rays, with ingredients such as Zinc oxide or titanium dioxide, wearing sun protective clothing and sun avoidance. We reviewed the warning signs of non-melanoma skin cancer and ABCDEs of melanoma  Please follow up should you notice any new or changing pre-existing skin lesion.    4. Hemangioma of skin  Cherry red papules    The benign nature of these skin lesions were reviewed, no treatment is necessary.   Please follow up for any new or pre-existing lesion that is changing in size, shape, color, becomes painful, tender, itches or bleed.      Follow up in 6 months for FBSE.

## 2024-07-12 LAB
AORTIC VALVE MEAN GRADIENT: 3 MMHG
AORTIC VALVE PEAK VELOCITY: 1.19 M/S
AV PEAK GRADIENT: 5.7 MMHG
AVA (PEAK VEL): 2.23 CM2
AVA (VTI): 2.12 CM2
EJECTION FRACTION APICAL 4 CHAMBER: 60.5
EJECTION FRACTION: 58 %
LEFT ATRIUM VOLUME AREA LENGTH INDEX BSA: 49.2 ML/M2
LEFT VENTRICLE INTERNAL DIMENSION DIASTOLE: 4.7 CM (ref 3.5–6)
LEFT VENTRICULAR OUTFLOW TRACT DIAMETER: 1.9 CM
LV EJECTION FRACTION BIPLANE: 55 %
MITRAL VALVE E/A RATIO: 1.71
RIGHT VENTRICLE FREE WALL PEAK S': 12 CM/S
RIGHT VENTRICLE PEAK SYSTOLIC PRESSURE: 39.5 MMHG
TRICUSPID ANNULAR PLANE SYSTOLIC EXCURSION: 2.7 CM

## 2024-07-15 DIAGNOSIS — E78.5 HYPERLIPIDEMIA, UNSPECIFIED HYPERLIPIDEMIA TYPE: ICD-10-CM

## 2024-07-15 RX ORDER — METOPROLOL SUCCINATE 100 MG/1
100 TABLET, EXTENDED RELEASE ORAL DAILY
Qty: 100 TABLET | Refills: 2 | Status: SHIPPED | OUTPATIENT
Start: 2024-07-15

## 2024-07-16 ENCOUNTER — LAB (OUTPATIENT)
Dept: LAB | Facility: LAB | Age: 74
End: 2024-07-16
Payer: MEDICARE

## 2024-07-16 ENCOUNTER — HOSPITAL ENCOUNTER (OUTPATIENT)
Dept: RADIOLOGY | Facility: HOSPITAL | Age: 74
Discharge: HOME | End: 2024-07-16
Payer: MEDICARE

## 2024-07-16 DIAGNOSIS — C43.59 MALIGNANT MELANOMA OF UPPER BACK (MULTI): ICD-10-CM

## 2024-07-16 DIAGNOSIS — Z92.89 HISTORY OF IMMUNOTHERAPY: ICD-10-CM

## 2024-07-16 LAB
ALBUMIN SERPL BCP-MCNC: 4.1 G/DL (ref 3.4–5)
ALP SERPL-CCNC: 63 U/L (ref 33–136)
ALT SERPL W P-5'-P-CCNC: 33 U/L (ref 10–52)
ANION GAP SERPL CALC-SCNC: 10 MMOL/L (ref 10–20)
AST SERPL W P-5'-P-CCNC: 25 U/L (ref 9–39)
BASOPHILS # BLD AUTO: 0.04 X10*3/UL (ref 0–0.1)
BASOPHILS NFR BLD AUTO: 0.5 %
BILIRUB SERPL-MCNC: 0.8 MG/DL (ref 0–1.2)
BUN SERPL-MCNC: 15 MG/DL (ref 6–23)
CALCIUM SERPL-MCNC: 9.2 MG/DL (ref 8.6–10.3)
CHLORIDE SERPL-SCNC: 100 MMOL/L (ref 98–107)
CO2 SERPL-SCNC: 27 MMOL/L (ref 21–32)
CORTIS SERPL-MCNC: 10.6 UG/DL (ref 2.5–20)
CREAT SERPL-MCNC: 0.91 MG/DL (ref 0.5–1.3)
EGFRCR SERPLBLD CKD-EPI 2021: 87 ML/MIN/1.73M*2
EOSINOPHIL # BLD AUTO: 0.07 X10*3/UL (ref 0–0.4)
EOSINOPHIL NFR BLD AUTO: 0.9 %
ERYTHROCYTE [DISTWIDTH] IN BLOOD BY AUTOMATED COUNT: 13.3 % (ref 11.5–14.5)
GLUCOSE SERPL-MCNC: 102 MG/DL (ref 74–99)
HCT VFR BLD AUTO: 47.9 % (ref 41–52)
HGB BLD-MCNC: 16 G/DL (ref 13.5–17.5)
IMM GRANULOCYTES # BLD AUTO: 0.02 X10*3/UL (ref 0–0.5)
IMM GRANULOCYTES NFR BLD AUTO: 0.3 % (ref 0–0.9)
LDH SERPL L TO P-CCNC: 156 U/L (ref 84–246)
LYMPHOCYTES # BLD AUTO: 1.92 X10*3/UL (ref 0.8–3)
LYMPHOCYTES NFR BLD AUTO: 24 %
MCH RBC QN AUTO: 32.1 PG (ref 26–34)
MCHC RBC AUTO-ENTMCNC: 33.4 G/DL (ref 32–36)
MCV RBC AUTO: 96 FL (ref 80–100)
MONOCYTES # BLD AUTO: 0.84 X10*3/UL (ref 0.05–0.8)
MONOCYTES NFR BLD AUTO: 10.5 %
NEUTROPHILS # BLD AUTO: 5.1 X10*3/UL (ref 1.6–5.5)
NEUTROPHILS NFR BLD AUTO: 63.8 %
NRBC BLD-RTO: 0 /100 WBCS (ref 0–0)
PLATELET # BLD AUTO: 145 X10*3/UL (ref 150–450)
POTASSIUM SERPL-SCNC: 5.1 MMOL/L (ref 3.5–5.3)
PROT SERPL-MCNC: 6.4 G/DL (ref 6.4–8.2)
RBC # BLD AUTO: 4.99 X10*6/UL (ref 4.5–5.9)
SODIUM SERPL-SCNC: 132 MMOL/L (ref 136–145)
TSH SERPL-ACNC: 1.96 MIU/L (ref 0.44–3.98)
WBC # BLD AUTO: 8 X10*3/UL (ref 4.4–11.3)

## 2024-07-16 PROCEDURE — 82024 ASSAY OF ACTH: CPT

## 2024-07-16 PROCEDURE — 71260 CT THORAX DX C+: CPT | Performed by: STUDENT IN AN ORGANIZED HEALTH CARE EDUCATION/TRAINING PROGRAM

## 2024-07-16 PROCEDURE — 2550000001 HC RX 255 CONTRASTS: Performed by: NURSE PRACTITIONER

## 2024-07-16 PROCEDURE — 82533 TOTAL CORTISOL: CPT

## 2024-07-16 PROCEDURE — 74177 CT ABD & PELVIS W/CONTRAST: CPT | Performed by: STUDENT IN AN ORGANIZED HEALTH CARE EDUCATION/TRAINING PROGRAM

## 2024-07-16 PROCEDURE — 36415 COLL VENOUS BLD VENIPUNCTURE: CPT

## 2024-07-16 PROCEDURE — 71260 CT THORAX DX C+: CPT

## 2024-07-18 ENCOUNTER — OFFICE VISIT (OUTPATIENT)
Dept: HEMATOLOGY/ONCOLOGY | Facility: CLINIC | Age: 74
End: 2024-07-18
Payer: MEDICARE

## 2024-07-18 VITALS
BODY MASS INDEX: 27.09 KG/M2 | HEART RATE: 67 BPM | RESPIRATION RATE: 14 BRPM | DIASTOLIC BLOOD PRESSURE: 68 MMHG | TEMPERATURE: 97.9 F | OXYGEN SATURATION: 96 % | SYSTOLIC BLOOD PRESSURE: 101 MMHG | WEIGHT: 157.8 LBS

## 2024-07-18 DIAGNOSIS — Z92.89 HISTORY OF IMMUNOTHERAPY: ICD-10-CM

## 2024-07-18 DIAGNOSIS — C77.9 MELANOMA METASTATIC TO LYMPH NODE (MULTI): Primary | ICD-10-CM

## 2024-07-18 DIAGNOSIS — R91.1 LUNG NODULE: ICD-10-CM

## 2024-07-18 DIAGNOSIS — C43.59 MALIGNANT MELANOMA OF UPPER BACK (MULTI): ICD-10-CM

## 2024-07-18 PROCEDURE — 1159F MED LIST DOCD IN RCRD: CPT | Performed by: NURSE PRACTITIONER

## 2024-07-18 PROCEDURE — 4004F PT TOBACCO SCREEN RCVD TLK: CPT | Performed by: NURSE PRACTITIONER

## 2024-07-18 PROCEDURE — 3078F DIAST BP <80 MM HG: CPT | Performed by: NURSE PRACTITIONER

## 2024-07-18 PROCEDURE — 99215 OFFICE O/P EST HI 40 MIN: CPT | Performed by: NURSE PRACTITIONER

## 2024-07-18 PROCEDURE — 1126F AMNT PAIN NOTED NONE PRSNT: CPT | Performed by: NURSE PRACTITIONER

## 2024-07-18 PROCEDURE — 1160F RVW MEDS BY RX/DR IN RCRD: CPT | Performed by: NURSE PRACTITIONER

## 2024-07-18 PROCEDURE — 3074F SYST BP LT 130 MM HG: CPT | Performed by: NURSE PRACTITIONER

## 2024-07-18 ASSESSMENT — ENCOUNTER SYMPTOMS
ENDOCRINE NEGATIVE: 1
PSYCHIATRIC NEGATIVE: 1
EYES NEGATIVE: 1
HEMATOLOGIC/LYMPHATIC NEGATIVE: 1
CARDIOVASCULAR NEGATIVE: 1
GASTROINTESTINAL NEGATIVE: 1
RESPIRATORY NEGATIVE: 1
NEUROLOGICAL NEGATIVE: 1
CONSTITUTIONAL NEGATIVE: 1

## 2024-07-18 ASSESSMENT — PAIN SCALES - GENERAL: PAINLEVEL: 0-NO PAIN

## 2024-07-18 NOTE — PATIENT INSTRUCTIONS
Mr. Gabriel Cowan ,  It was a pleasure talking to you today.    As discussed, we will meet again 10/10/2024. Please have labs 10/04/2024 and a CT scan prior 10/06/2024 at [facility]. Please continue to follow up with dermatology and surgical oncology.     Our offices will be reaching out to you to make all the appointments. I am always available at the phone numbers listed below for an earlier appointment should you wish one.    In case of an emergency please dial 911 or report to your nearest Emergency Room.  For all other questions, please do not hesitate to reach out to us at the number listed below.    Thank you for choosing [facility] at [facility].   We appreciate your visit.     MD Brisa Landeros APRN    Sarcoma and Cutaneous Oncology team    Phone: [phone]  Fax: [phone].

## 2024-07-18 NOTE — PROGRESS NOTES
".Patient ID: Gabriel Cowan is a 74 y.o. male.  Referring Physician: Brisa Walden, APRN-CNP  [id number] [place] [address]  Primary Care Provider: Montana Hernandez DO     Oncology follow up     HPI   Follow-up    Melanoma       Follow up imaging results and immunotherapy.    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 He met with our team on 4/6/23.      Referring Surgeon: Dr. Kp Kilgore  Dermatologist: Yin Cano DO   Date of first meeting with our team: 04/06/2023     Date of First meeting with surgical team: 02/14/2023  Melanoma type: Cutaneous- Superficial Spreading type  Location of primary site: Left Upper back  Date of WLE and SLNB: 3/6/23  Final pathology: Breslow Depth- 1.6 mm; Ulceration status- Negative; LVI- No; Other high risk features- Microsatelitosis present.  [place] lymph node status: 2/2 positive. Largest deposit 9mm. No JUAN.  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 WLE and SLNB completed on 3/6/23. He was staged at IIIC melanoma. He started keytruda on 4/27/2023 at 400mg IV dose. Surveillance CT scans 01/09/24 did not show recurrence. Completed one year of Keytruda 04/04/2024 and is currently on surveillance.      Treatment History:   Systemic therapy.   1. Keytruda 400mg (adjuvant setting)  C1- 04/27/2023, C2- 06/08/2023, C3- 07/20/2023, C4- 08/31/2023, C5- 10/12/2023, C6- 11/30/2023  C7- 01/11/2024, C8- 02/22/2024, C9- 04/04/2024    Subjective   Please refer to \"Notes/Cancer History\" above for complete History of present illness.     Mr Gabriel Cowan     - Presents to clinic alone.   - Saw his CT scan and wondering about next steps.   - Continues to walk 8 miles 5 days a week and enjoy time with his " grandson.  - Continues skin checks with dermatology and bobby basin ultrasound with surgical oncology.      Review of Systems:   Review of Systems   Constitutional: Negative.    HENT:  Negative.     Eyes: Negative.    Respiratory: Negative.     Cardiovascular: Negative.    Gastrointestinal: Negative.    Endocrine: Negative.    Genitourinary: Negative.     Skin: Negative.    Neurological: Negative.    Hematological: Negative.    Psychiatric/Behavioral: Negative.           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)        FAMILY HISTORY  Family History   Problem Relation Name Age of Onset    No Known Problems Mother      No Known Problems Father      No Known Problems Sister      No Known Problems Brother         TOBACCO HISTORY  Tobacco Use: High Risk (7/18/2024)    Patient History     Smoking Tobacco Use: Some Days     Smokeless Tobacco Use: Never     Passive Exposure: Never       SOCIAL HISTORY  Social Connections: Not on file   Retired ER physician, , lives with his wife     Outpatient Medication Profile:  Current Outpatient Medications on File Prior to Visit   Medication Sig Dispense Refill    atorvastatin (Lipitor) 80 mg tablet TAKE 1 TABLET BY MOUTH ONCE  DAILY 100 tablet 2    ezetimibe (Zetia) 10 mg tablet Take 1 tablet (10 mg) by mouth once daily at bedtime. 90 tablet 3    lisinopril 20 mg tablet TAKE 1 TABLET BY MOUTH ONCE  DAILY 100 tablet 2    metoprolol succinate XL (Toprol-XL) 100 mg 24 hr tablet TAKE 1 TABLET BY MOUTH ONCE  DAILY. DO NOT CRUSH OR CHEW 100 tablet 2    pembrolizumab (Keytruda) 25 mg/mL chemo injection Infuse into a venous catheter 1 time.      [DISCONTINUED] metoprolol succinate XL (Toprol-XL) 100 mg 24 hr tablet Take 1 tablet (100 mg) by mouth once daily. Do not crush or chew. 90 tablet 3     No current  facility-administered medications on file prior to visit.         Performance Status:  Asymptomatic     Vitals and Measurements:   /68 (BP Location: Left arm, Patient Position: Sitting)   Pulse 67   Temp 36.6 °C (97.9 °F) (Temporal)   Resp 14   Wt 71.6 kg (157 lb 12.8 oz)   SpO2 96%   BMI 27.09 kg/m²    Vitals:    07/18/24 0945   BP: 101/68   Pulse: 67   Resp: 14   Temp: 36.6 °C (97.9 °F)   TempSrc: Temporal   SpO2: 96%   Weight: 71.6 kg (157 lb 12.8 oz)   PainSc: 0-No pain     Physical Exam:   Physical Exam  Constitutional:       Appearance: Normal appearance.   HENT:      Head: Normocephalic and atraumatic.      Nose: Nose normal.      Mouth/Throat:      Mouth: Mucous membranes are moist.      Pharynx: Oropharynx is clear.   Eyes:      Conjunctiva/sclera: Conjunctivae normal.   Cardiovascular:      Rate and Rhythm: Normal rate and regular rhythm.      Pulses: Normal pulses.      Heart sounds: Normal heart sounds.   Pulmonary:      Effort: Pulmonary effort is normal.      Breath sounds: Normal breath sounds.   Abdominal:      General: Bowel sounds are normal.      Palpations: Abdomen is soft.   Musculoskeletal:         General: Normal range of motion.      Cervical back: Neck supple.   Skin:     General: Skin is warm and dry.      Comments: Well healed incision on back without nodules    Neurological:      General: No focal deficit present.      Mental Status: He is alert and oriented to person, place, and time.   Psychiatric:         Mood and Affect: Mood normal.         Behavior: Behavior normal.      Lab Results:  I have reviewed these laboratory results:     Lab Results   Component Value Date    WBC 8.0 07/16/2024    HGB 16.0 07/16/2024    HCT 47.9 07/16/2024    MCV 96 07/16/2024     (L) 07/16/2024         Chemistry    Lab Results   Component Value Date/Time     (L) 07/16/2024 0959    K 5.1 07/16/2024 0959     07/16/2024 0959    CO2 27 07/16/2024 0959    BUN 15 07/16/2024 0959     CREATININE 0.91 07/16/2024 0959    Lab Results   Component Value Date/Time    CALCIUM 9.2 07/16/2024 0959    ALKPHOS 63 07/16/2024 0959    AST 25 07/16/2024 0959    ALT 33 07/16/2024 0959    BILITOT 0.8 07/16/2024 0959            Lab Results   Component Value Date    TSH 1.96 07/16/2024        Radiology Result:  I have reviewed the latest Imaging in PACS and the findings are noted in this note. I discussed the results of the latest imaging with the patient. All previous imaging were reviewed at the time it was completed. Full records are available in the EMR for review as well.     STUDY:  CT CHEST ABDOMEN PELVIS W IV CONTRAST;  7/16/2024 11:18 am    IMPRESSION:  CHEST:  1. Prior left axillary lymph node dissection. No recurrent enlarged  intrathoracic lymphadenopathy.  2. New left upper lobe 0.3 cm pulmonary nodule which remains  indeterminate and could be infectious or inflammatory, however early  developing metastatic could not be entirely excluded. Advise  attention on short-term follow-up  3. Severe coronary artery calcifications.      ABDOMEN-PELVIS:  1. No metastatic disease in the abdomen or pelvis.    STUDY:  US NONVASCULAR EXTREMITY US EXTREMITY NONVASCULAR REAL TIME WITH  IMAGE DOCUMENTATION COMPLETE;  4/23/2024 10:03 am    IMPRESSION:      1. History of metastatic melanoma.  2. No abnormal lymph nodes in the left axilla.  3. The previously seen 0.4 x 0.4 x 0.4 cm rounded lymph node in the  left axilla with absent hilum it has no longer present.    Pathology Results:  I have reviewed the full pathology report recorded in the EMR. The pertinent portions indicating diagnosis are listed here in the note. for details please refer to the full report recorded in the EMR.    I have reviewed the full pathology report recorded in the EMR. The pertinent portions indicating diagnosis are listed here in the note. for details please refer to the full report recorded in the EMR.     Dermatopathology [Jan 26 2023  4:25PM] ([id number])  Specimens: SKIN, L LATERAL BACK   Name GABRIEL COWAN     Accession  #: [id number]   Pathologist: KENY LOPEZ MD   Date ofProcedure: 1/24/2023   Date Received: 1/25/2023   Date Reported 1/26/2023   Submitting Physician: BRANDO MOORE DO   Location: [facility] Other External #     FINAL  DIAGNOSIS   SKIN, L LATERAL BACK, SHAVE BIOPSY:   INVASIVE MALIGNANT MELANOMA, EXTENDING TO A DEPTH OF APPROXIMATELY 1.6 MM (SEE COMMENT):     Comment: The broad shave specimen is sectioned into five portions and reveals a broad severely  atypical compound melanocytic proliferation with areas of tumor regression. The invasive component extends focally to the deep margin at a peripheral margin. See synoptic summary below.     Electronically Signed Out by KENY LOPEZ MD.     Addendum Diagnosis   TEST: BRAF Exon 15 Sequencing   SPECIMEN: FFPE, SKIN LEFT UPPER BACK, [id number] C10   DISEASE DIAGNOSIS: Melanoma   Estimated Tumor Content: 40%   COLLECTION  DATE: 3/6/2023   RECEIVED DATE: 3/20/2023   REPORT DATE: 03/24/2023    RESULT:   BRAF p.V600E ([id number])     INTERPRETATION AND POTENTIAL THERAPEUTIC OPTIONS:   BRAF p.V600E   BRAF V600E mutations are associated with sensitivity to BRAF and MEK   inhibitors.      Assessment and Plan:   Assessment/Plan   Mr. Gabriel Cowan is a 74 y.o. male with a diagnosis of melanoma IIIC on the back. WLE and SLNB 03/06/2023. BRAF positive. Completed one year (9 cycles) of Keytruda 04/04/2024. Please see the evolution of the case listed above in the cancer history.      # Melanoma IIIC  - CT 07/16/2024 showing new 3mm lung nodule. I will follow up on a CT in 12 weeks on 10/08/2024. Severe CAD noted. He is following with cardiology.   - Continue surveillance.   - RTC 10/10/2024.  - CT scan 10/08/2024. Labs prior 10/04/2024.  - Continue bobby basin u/s with surgical oncology. Last 04/23/2024.  - Continue skin checks with dermatology. Next visit 01/14/2025.     #  CAD/HTN  - Follows with cardiology and his PCP.      DISCLAIMER:   In preparing for this visit and writing this note, I reviewed all the previous electronic medical records (labs, imaging and medical charts) of the patient available in the physician portal. Significant findings which helped in decision making are recorded  in this chart.     The plan was discussed with the patient. We gave him ample opportunities to ask questions. All questions were answered to his satisfaction and he verbalized understanding.

## 2024-07-20 LAB — ACTH PLAS-MCNC: 13.4 PG/ML (ref 7.2–63.3)

## 2024-08-19 ENCOUNTER — HOSPITAL ENCOUNTER (OUTPATIENT)
Dept: RADIOLOGY | Facility: HOSPITAL | Age: 74
Discharge: HOME | End: 2024-08-19
Payer: MEDICARE

## 2024-08-19 ENCOUNTER — OFFICE VISIT (OUTPATIENT)
Dept: PRIMARY CARE | Facility: CLINIC | Age: 74
End: 2024-08-19
Payer: MEDICARE

## 2024-08-19 VITALS
RESPIRATION RATE: 14 BRPM | TEMPERATURE: 97.5 F | WEIGHT: 157 LBS | BODY MASS INDEX: 26.8 KG/M2 | HEART RATE: 55 BPM | OXYGEN SATURATION: 95 % | HEIGHT: 64 IN | DIASTOLIC BLOOD PRESSURE: 68 MMHG | SYSTOLIC BLOOD PRESSURE: 138 MMHG

## 2024-08-19 DIAGNOSIS — R07.81 RIB PAIN: ICD-10-CM

## 2024-08-19 DIAGNOSIS — Z00.00 PERIODIC HEALTH ASSESSMENT, GENERAL SCREENING, ADULT: Primary | ICD-10-CM

## 2024-08-19 DIAGNOSIS — E78.5 DYSLIPIDEMIA: ICD-10-CM

## 2024-08-19 DIAGNOSIS — R73.9 ELEVATED BLOOD SUGAR: ICD-10-CM

## 2024-08-19 DIAGNOSIS — Z00.00 WELLNESS EXAMINATION: ICD-10-CM

## 2024-08-19 DIAGNOSIS — I25.10 CORONARY ARTERY DISEASE INVOLVING NATIVE CORONARY ARTERY OF NATIVE HEART WITHOUT ANGINA PECTORIS: ICD-10-CM

## 2024-08-19 DIAGNOSIS — I73.9 PAD (PERIPHERAL ARTERY DISEASE) (CMS-HCC): ICD-10-CM

## 2024-08-19 DIAGNOSIS — I10 BENIGN ESSENTIAL HYPERTENSION: ICD-10-CM

## 2024-08-19 PROCEDURE — 99397 PER PM REEVAL EST PAT 65+ YR: CPT | Performed by: INTERNAL MEDICINE

## 2024-08-19 PROCEDURE — 71046 X-RAY EXAM CHEST 2 VIEWS: CPT

## 2024-08-19 PROCEDURE — 3078F DIAST BP <80 MM HG: CPT | Performed by: INTERNAL MEDICINE

## 2024-08-19 PROCEDURE — 4004F PT TOBACCO SCREEN RCVD TLK: CPT | Performed by: INTERNAL MEDICINE

## 2024-08-19 PROCEDURE — G0439 PPPS, SUBSEQ VISIT: HCPCS | Performed by: INTERNAL MEDICINE

## 2024-08-19 PROCEDURE — 1170F FXNL STATUS ASSESSED: CPT | Performed by: INTERNAL MEDICINE

## 2024-08-19 PROCEDURE — 1159F MED LIST DOCD IN RCRD: CPT | Performed by: INTERNAL MEDICINE

## 2024-08-19 PROCEDURE — 71046 X-RAY EXAM CHEST 2 VIEWS: CPT | Performed by: RADIOLOGY

## 2024-08-19 PROCEDURE — 1160F RVW MEDS BY RX/DR IN RCRD: CPT | Performed by: INTERNAL MEDICINE

## 2024-08-19 PROCEDURE — 3075F SYST BP GE 130 - 139MM HG: CPT | Performed by: INTERNAL MEDICINE

## 2024-08-19 RX ORDER — HYDROCODONE BITARTRATE AND ACETAMINOPHEN 5; 325 MG/1; MG/1
1 TABLET ORAL EVERY 6 HOURS PRN
Qty: 20 TABLET | Refills: 0 | Status: SHIPPED | OUTPATIENT
Start: 2024-08-19 | End: 2024-08-26

## 2024-08-19 RX ORDER — HYDROCODONE BITARTRATE AND ACETAMINOPHEN 5; 325 MG/1; MG/1
1 TABLET ORAL EVERY 6 HOURS PRN
Qty: 20 TABLET | Refills: 0 | Status: SHIPPED | OUTPATIENT
Start: 2024-08-19 | End: 2024-08-19

## 2024-08-19 RX ORDER — ASPIRIN 81 MG/1
81 TABLET ORAL DAILY
COMMUNITY

## 2024-08-19 RX ORDER — LIDOCAINE 50 MG/G
1 PATCH TOPICAL DAILY
Qty: 15 PATCH | Refills: 1 | Status: SHIPPED | OUTPATIENT
Start: 2024-08-19

## 2024-08-19 RX ORDER — LIDOCAINE 50 MG/G
1 PATCH TOPICAL DAILY
Qty: 15 PATCH | Refills: 1 | Status: SHIPPED | OUTPATIENT
Start: 2024-08-19 | End: 2024-08-19

## 2024-08-19 ASSESSMENT — ENCOUNTER SYMPTOMS
NAUSEA: 0
PALPITATIONS: 0
DIARRHEA: 0
COUGH: 0
SHORTNESS OF BREATH: 0
WHEEZING: 0
CONSTIPATION: 0
ABDOMINAL PAIN: 0

## 2024-08-19 ASSESSMENT — ACTIVITIES OF DAILY LIVING (ADL)
MANAGING_FINANCES: INDEPENDENT
DOING_HOUSEWORK: INDEPENDENT
BATHING: INDEPENDENT
DRESSING: INDEPENDENT
GROCERY_SHOPPING: INDEPENDENT
TAKING_MEDICATION: INDEPENDENT

## 2024-08-19 NOTE — PROGRESS NOTES
"Subjective   Patient ID: Gabriel Cowan is a 74 y.o. male who presents for Medicare Annual Wellness Visit Subsequent (AND RIB  PAIN FROM A FALL..).    Fell just yesterday.  His dog ran off on him and as he lunged for his dog he fell and landed across the concrete on his ribs.  He complains of lateral chest wall pain worse with deep inspiration.  No shortness of breath otherwise.  Other than this recent had been doing well otherwise.  He was typically active and without any issues of chest pain, shortness of breath or dizzy spells.  No issues or changes in bowel or bladder habits.  Some mild joint pains but nothing that significantly affected his activity levels.    Review of Systems   Respiratory:  Negative for cough, shortness of breath and wheezing.    Cardiovascular:  Positive for chest pain. Negative for palpitations.        LEFT RIBS   Gastrointestinal:  Negative for abdominal pain, constipation, diarrhea and nausea.       Objective   /68 (BP Location: Left arm, Patient Position: Sitting, BP Cuff Size: Adult)   Pulse 55   Temp 36.4 °C (97.5 °F) (Tympanic)   Resp 14   Ht 1.626 m (5' 4\")   Wt 71.2 kg (157 lb)   SpO2 95%   BMI 26.95 kg/m²     Physical Exam  Vitals reviewed.   Constitutional:       Appearance: Normal appearance.   HENT:      Head: Normocephalic.   Cardiovascular:      Rate and Rhythm: Normal rate and regular rhythm.   Pulmonary:      Effort: Pulmonary effort is normal.      Breath sounds: Normal breath sounds.   Musculoskeletal:         General: Tenderness present. Normal range of motion.      Comments: Chest wall tenderness   Neurological:      General: No focal deficit present.      Mental Status: He is alert.   Psychiatric:         Mood and Affect: Mood normal.         Assessment/Plan   Problem List Items Addressed This Visit             ICD-10-CM    Benign essential hypertension - Primary I10    CAD (coronary artery disease) I25.10    Dyslipidemia E78.5    PAD (peripheral artery " disease) (CMS-[facility]) I73.9     Other Visit Diagnoses         Codes    Rib pain     R07.81    Relevant Medications    HYDROcodone-acetaminophen (Norco) 5-325 mg tablet    lidocaine (Lidoderm) 5 % patch    Other Relevant Orders    XR chest 2 views    Elevated blood sugar     R73.9    Periodic health assessment, general screening, adult     Z00.00    Wellness examination     Z00.00        We discussed the above.  We stressed the need for healthy diet and regular exercise.  He does still occasionally smoke a cigar but no other tobacco use.  We discussed weaning off and quitting this eventually.  Reviewed and discussed his current medications as well as his most recent blood test.  Hypertension is controlled.  He has had some mildly elevated blood sugars and we did discuss the benefits of a low sugar diet in this regard.  He does follow with cardiology as well.  We discussed the need to continue to monitor his cardiac risk factors in particular his blood pressure, blood sugars and lipids.  For his fall and chest wall pain we will proceed with a chest x-ray to be sure no significant rib fractures as well as no pneumothorax.  We discussed pain control.  We stressed the need for deep inspiration exercises.  Annual Wellness Visit questions and answers were reviewed and discussed including the importance of discussing end of life wishes as well as having a living will and health care power of .     Follow-up in 6 months, sooner if any issues

## 2024-08-20 ENCOUNTER — TELEPHONE (OUTPATIENT)
Dept: PRIMARY CARE | Facility: CLINIC | Age: 74
End: 2024-08-20
Payer: MEDICARE

## 2024-08-21 ENCOUNTER — TELEPHONE (OUTPATIENT)
Dept: PRIMARY CARE | Facility: CLINIC | Age: 74
End: 2024-08-21
Payer: MEDICARE

## 2024-08-21 DIAGNOSIS — R07.81 RIB PAIN: ICD-10-CM

## 2024-08-26 DIAGNOSIS — R07.81 RIB PAIN: ICD-10-CM

## 2024-08-26 RX ORDER — HYDROCODONE BITARTRATE AND ACETAMINOPHEN 5; 325 MG/1; MG/1
1 TABLET ORAL EVERY 6 HOURS PRN
Qty: 20 TABLET | Refills: 0 | Status: SHIPPED | OUTPATIENT
Start: 2024-08-26 | End: 2024-09-02

## 2024-08-26 RX ORDER — LIDOCAINE 50 MG/G
1 PATCH TOPICAL DAILY
Qty: 15 PATCH | Refills: 1 | Status: SHIPPED | OUTPATIENT
Start: 2024-08-26 | End: 2024-08-26 | Stop reason: SDUPTHER

## 2024-08-26 RX ORDER — HYDROCODONE BITARTRATE AND ACETAMINOPHEN 5; 325 MG/1; MG/1
1 TABLET ORAL EVERY 6 HOURS PRN
Qty: 20 TABLET | Refills: 0 | Status: SHIPPED | OUTPATIENT
Start: 2024-08-26 | End: 2024-08-26 | Stop reason: SDUPTHER

## 2024-08-26 RX ORDER — LIDOCAINE 50 MG/G
1 PATCH TOPICAL DAILY
Qty: 15 PATCH | Refills: 1 | Status: SHIPPED | OUTPATIENT
Start: 2024-08-26

## 2024-10-07 ENCOUNTER — LAB (OUTPATIENT)
Dept: LAB | Facility: CLINIC | Age: 74
End: 2024-10-07
Payer: MEDICARE

## 2024-10-07 DIAGNOSIS — R91.1 LUNG NODULE: ICD-10-CM

## 2024-10-07 DIAGNOSIS — C77.9 MELANOMA METASTATIC TO LYMPH NODE (MULTI): ICD-10-CM

## 2024-10-07 LAB
ALBUMIN SERPL BCP-MCNC: 4.2 G/DL (ref 3.4–5)
ALP SERPL-CCNC: 72 U/L (ref 33–136)
ALT SERPL W P-5'-P-CCNC: 28 U/L (ref 10–52)
ANION GAP SERPL CALC-SCNC: 10 MMOL/L (ref 10–20)
AST SERPL W P-5'-P-CCNC: 26 U/L (ref 9–39)
BASOPHILS # BLD AUTO: 0.03 X10*3/UL (ref 0–0.1)
BASOPHILS NFR BLD AUTO: 0.4 %
BILIRUB SERPL-MCNC: 0.8 MG/DL (ref 0–1.2)
BUN SERPL-MCNC: 16 MG/DL (ref 6–23)
CALCIUM SERPL-MCNC: 9.1 MG/DL (ref 8.6–10.3)
CHLORIDE SERPL-SCNC: 103 MMOL/L (ref 98–107)
CO2 SERPL-SCNC: 29 MMOL/L (ref 21–32)
CREAT SERPL-MCNC: 0.94 MG/DL (ref 0.5–1.3)
EGFRCR SERPLBLD CKD-EPI 2021: 84 ML/MIN/1.73M*2
EOSINOPHIL # BLD AUTO: 0.07 X10*3/UL (ref 0–0.4)
EOSINOPHIL NFR BLD AUTO: 1 %
ERYTHROCYTE [DISTWIDTH] IN BLOOD BY AUTOMATED COUNT: 14.1 % (ref 11.5–14.5)
GLUCOSE SERPL-MCNC: 112 MG/DL (ref 74–99)
HCT VFR BLD AUTO: 44.7 % (ref 41–52)
HGB BLD-MCNC: 14.9 G/DL (ref 13.5–17.5)
IMM GRANULOCYTES # BLD AUTO: 0.01 X10*3/UL (ref 0–0.5)
IMM GRANULOCYTES NFR BLD AUTO: 0.1 % (ref 0–0.9)
LDH SERPL L TO P-CCNC: 148 U/L (ref 84–246)
LYMPHOCYTES # BLD AUTO: 1.74 X10*3/UL (ref 0.8–3)
LYMPHOCYTES NFR BLD AUTO: 24.2 %
MCH RBC QN AUTO: 32.8 PG (ref 26–34)
MCHC RBC AUTO-ENTMCNC: 33.3 G/DL (ref 32–36)
MCV RBC AUTO: 99 FL (ref 80–100)
MONOCYTES # BLD AUTO: 0.75 X10*3/UL (ref 0.05–0.8)
MONOCYTES NFR BLD AUTO: 10.4 %
NEUTROPHILS # BLD AUTO: 4.59 X10*3/UL (ref 1.6–5.5)
NEUTROPHILS NFR BLD AUTO: 63.9 %
PLATELET # BLD AUTO: 164 X10*3/UL (ref 150–450)
POTASSIUM SERPL-SCNC: 4.3 MMOL/L (ref 3.5–5.3)
PROT SERPL-MCNC: 6.5 G/DL (ref 6.4–8.2)
RBC # BLD AUTO: 4.54 X10*6/UL (ref 4.5–5.9)
SODIUM SERPL-SCNC: 138 MMOL/L (ref 136–145)
WBC # BLD AUTO: 7.2 X10*3/UL (ref 4.4–11.3)

## 2024-10-07 PROCEDURE — 36415 COLL VENOUS BLD VENIPUNCTURE: CPT

## 2024-10-07 PROCEDURE — 84075 ASSAY ALKALINE PHOSPHATASE: CPT

## 2024-10-07 PROCEDURE — 85025 COMPLETE CBC W/AUTO DIFF WBC: CPT

## 2024-10-07 PROCEDURE — 83615 LACTATE (LD) (LDH) ENZYME: CPT

## 2024-10-08 ENCOUNTER — HOSPITAL ENCOUNTER (OUTPATIENT)
Dept: RADIOLOGY | Facility: HOSPITAL | Age: 74
Discharge: HOME | End: 2024-10-08
Payer: MEDICARE

## 2024-10-08 DIAGNOSIS — R91.1 LUNG NODULE: ICD-10-CM

## 2024-10-08 DIAGNOSIS — C77.9 MELANOMA METASTATIC TO LYMPH NODE (MULTI): ICD-10-CM

## 2024-10-08 PROCEDURE — 2550000001 HC RX 255 CONTRASTS: Performed by: NURSE PRACTITIONER

## 2024-10-08 PROCEDURE — 74177 CT ABD & PELVIS W/CONTRAST: CPT

## 2024-10-08 PROCEDURE — 71260 CT THORAX DX C+: CPT | Performed by: STUDENT IN AN ORGANIZED HEALTH CARE EDUCATION/TRAINING PROGRAM

## 2024-10-08 PROCEDURE — 74177 CT ABD & PELVIS W/CONTRAST: CPT | Performed by: STUDENT IN AN ORGANIZED HEALTH CARE EDUCATION/TRAINING PROGRAM

## 2024-10-10 ENCOUNTER — OFFICE VISIT (OUTPATIENT)
Dept: HEMATOLOGY/ONCOLOGY | Facility: CLINIC | Age: 74
End: 2024-10-10
Payer: MEDICARE

## 2024-10-10 VITALS
TEMPERATURE: 96.8 F | BODY MASS INDEX: 27.48 KG/M2 | OXYGEN SATURATION: 97 % | DIASTOLIC BLOOD PRESSURE: 75 MMHG | WEIGHT: 160.1 LBS | HEART RATE: 53 BPM | SYSTOLIC BLOOD PRESSURE: 135 MMHG | RESPIRATION RATE: 14 BRPM

## 2024-10-10 DIAGNOSIS — R91.1 LUNG NODULE: ICD-10-CM

## 2024-10-10 DIAGNOSIS — R91.8 MULTIPLE LUNG NODULES ON CT: ICD-10-CM

## 2024-10-10 DIAGNOSIS — Z91.81 HISTORY OF RECENT FALL: ICD-10-CM

## 2024-10-10 DIAGNOSIS — C77.9 MELANOMA METASTATIC TO LYMPH NODE (MULTI): Primary | ICD-10-CM

## 2024-10-10 PROCEDURE — 99215 OFFICE O/P EST HI 40 MIN: CPT | Performed by: NURSE PRACTITIONER

## 2024-10-10 PROCEDURE — 4004F PT TOBACCO SCREEN RCVD TLK: CPT | Performed by: NURSE PRACTITIONER

## 2024-10-10 PROCEDURE — 3078F DIAST BP <80 MM HG: CPT | Performed by: NURSE PRACTITIONER

## 2024-10-10 PROCEDURE — 1159F MED LIST DOCD IN RCRD: CPT | Performed by: NURSE PRACTITIONER

## 2024-10-10 PROCEDURE — 1160F RVW MEDS BY RX/DR IN RCRD: CPT | Performed by: NURSE PRACTITIONER

## 2024-10-10 PROCEDURE — 1126F AMNT PAIN NOTED NONE PRSNT: CPT | Performed by: NURSE PRACTITIONER

## 2024-10-10 PROCEDURE — 3075F SYST BP GE 130 - 139MM HG: CPT | Performed by: NURSE PRACTITIONER

## 2024-10-10 ASSESSMENT — ENCOUNTER SYMPTOMS
PSYCHIATRIC NEGATIVE: 1
NEUROLOGICAL NEGATIVE: 1
CARDIOVASCULAR NEGATIVE: 1
GASTROINTESTINAL NEGATIVE: 1
CONSTITUTIONAL NEGATIVE: 1
ENDOCRINE NEGATIVE: 1
EYES NEGATIVE: 1
HEMATOLOGIC/LYMPHATIC NEGATIVE: 1
RESPIRATORY NEGATIVE: 1

## 2024-10-10 ASSESSMENT — PAIN SCALES - GENERAL: PAINLEVEL: 0-NO PAIN

## 2024-10-10 NOTE — PROGRESS NOTES
".Patient ID: Gabriel Cowan is a 74 y.o. male.  Referring Physician: Brisa Walden, APRN-CNP  [address] [place] [address]  Primary Care Provider: Montana Hernandez DO     Oncology follow up     HPI   Follow-up    Melanoma       Follow up imaging results and immunotherapy.    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 He met with our team on 4/6/23.      Referring Surgeon: Dr. Kp Kilgore  Dermatologist: Yin Cano DO   Date of first meeting with our team: 04/06/2023     Date of First meeting with surgical team: 02/14/2023  Melanoma type: Cutaneous- Superficial Spreading type  Location of primary site: Left Upper back  Date of WLE and SLNB: 3/6/23  Final pathology: Breslow Depth- 1.6 mm; Ulceration status- Negative; LVI- No; Other high risk features- Microsatelitosis present.  [place] lymph node status: 2/2 positive. Largest deposit 9mm. No JUAN.  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 WLE and SLNB completed on 3/6/23. He was staged at IIIC melanoma. He started keytruda on 4/27/2023 at 400mg IV dose. Surveillance CT scans 01/09/24 did not show recurrence. Completed one year of Keytruda 04/04/2024 and is currently on surveillance.      Treatment History:   Systemic therapy.   1. Keytruda 400mg (adjuvant setting)  C1- 04/27/2023, C2- 06/08/2023, C3- 07/20/2023, C4- 08/31/2023, C5- 10/12/2023, C6- 11/30/2023  C7- 01/11/2024, C8- 02/22/2024, C9- 04/04/2024    Subjective   Please refer to \"Notes/Cancer History\" above for complete History of present illness.     Mr Gabriel Cowan     - Presents to clinic alone.   - Fell in August and fractured his ribs. Fall occurred when he was trying to get his dog.    - Otherwise, no new symptoms. "   - Continues to enjoy time with his grandson.  - Continues skin checks with dermatology and bobby basin ultrasound with surgical oncology.      Review of Systems:   Review of Systems   Constitutional: Negative.    HENT:  Negative.     Eyes: Negative.    Respiratory: Negative.     Cardiovascular: Negative.    Gastrointestinal: Negative.    Endocrine: Negative.    Genitourinary: Negative.     Musculoskeletal:         Broken ribs 08/2024   Skin: Negative.    Neurological: Negative.    Hematological: Negative.    Psychiatric/Behavioral: Negative.           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)        FAMILY HISTORY  Family History   Problem Relation Name Age of Onset    No Known Problems Mother      No Known Problems Father      No Known Problems Sister      No Known Problems Brother         TOBACCO HISTORY  Tobacco Use: High Risk (8/19/2024)    Patient History     Smoking Tobacco Use: Some Days     Smokeless Tobacco Use: Never     Passive Exposure: Never       SOCIAL HISTORY  Social Connections: Not on file   Retired ER physician, , lives with his wife     Outpatient Medication Profile:  Current Outpatient Medications on File Prior to Visit   Medication Sig Dispense Refill    aspirin 81 mg EC tablet Take 1 tablet (81 mg) by mouth once daily.      atorvastatin (Lipitor) 80 mg tablet TAKE 1 TABLET BY MOUTH ONCE  DAILY 100 tablet 2    ezetimibe (Zetia) 10 mg tablet Take 1 tablet (10 mg) by mouth once daily at bedtime. 90 tablet 3    lidocaine (Lidoderm) 5 % patch Place 1 patch over 12 hours on the skin once daily. Remove & discard patch within 12 hours or as directed by MD. 15 patch 1    lisinopril 20 mg tablet TAKE 1 TABLET BY MOUTH ONCE  DAILY 100 tablet 2    metoprolol succinate XL (Toprol-XL) 100 mg 24 hr tablet TAKE 1 TABLET BY MOUTH ONCE   DAILY. DO NOT CRUSH OR CHEW 100 tablet 2     No current facility-administered medications on file prior to visit.         Performance Status:  Asymptomatic     Vitals and Measurements:   Vitals:    10/10/24 0953   BP: 135/75   Pulse: 53   Resp: 14   Temp: 36 °C (96.8 °F)   TempSrc: Temporal   SpO2: 97%   Weight: 72.6 kg (160 lb 1.6 oz)   PainSc: 0-No pain       Physical Exam:   Physical Exam  Constitutional:       Appearance: Normal appearance.   HENT:      Head: Normocephalic and atraumatic.      Nose: Nose normal.      Mouth/Throat:      Mouth: Mucous membranes are moist.      Pharynx: Oropharynx is clear.   Eyes:      Conjunctiva/sclera: Conjunctivae normal.   Cardiovascular:      Rate and Rhythm: Normal rate and regular rhythm.      Pulses: Normal pulses.      Heart sounds: Normal heart sounds.   Pulmonary:      Effort: Pulmonary effort is normal.      Breath sounds: Normal breath sounds.   Abdominal:      General: Bowel sounds are normal.      Palpations: Abdomen is soft.   Musculoskeletal:         General: Normal range of motion.      Cervical back: Neck supple.   Skin:     General: Skin is warm and dry.      Comments: Well healed incision on back without nodules    Neurological:      General: No focal deficit present.      Mental Status: He is alert and oriented to person, place, and time.   Psychiatric:         Mood and Affect: Mood normal.         Behavior: Behavior normal.      Lab Results:  I have reviewed these laboratory results:     Lab Results   Component Value Date    WBC 7.2 10/07/2024    HGB 14.9 10/07/2024    HCT 44.7 10/07/2024    MCV 99 10/07/2024     10/07/2024         Chemistry    Lab Results   Component Value Date/Time     10/07/2024 0849    K 4.3 10/07/2024 0849     10/07/2024 0849    CO2 29 10/07/2024 0849    BUN 16 10/07/2024 0849    CREATININE 0.94 10/07/2024 0849    Lab Results   Component Value Date/Time    CALCIUM 9.1 10/07/2024 0849    ALKPHOS 72 10/07/2024 0849     AST 26 10/07/2024 0849    ALT 28 10/07/2024 0849    BILITOT 0.8 10/07/2024 0849            Lab Results   Component Value Date    TSH 1.96 07/16/2024        Radiology Result:  I have reviewed the latest Imaging in PACS and the findings are noted in this note. I discussed the results of the latest imaging with the patient. All previous imaging were reviewed at the time it was completed. Full records are available in the EMR for review as well.     CT CHEST ABDOMEN PELVIS W IV CONTRAST; 10/8/2024 10:59 am     IMPRESSION:  CHEST:  1. Prior left axillary lymph node dissection. No recurrent enlarged  intrathoracic lymphadenopathy.  2. New scattered bilateral lower lobe predominance pulmonary nodules  measuring up to 0.4 cm which could be infectious or inflammatory,  however early developing metastatic could not be entirely excluded  which warrant attention on follow-up. To note, previously seen left  upper lobe/lingular tiny nodule has significantly improved/near  completely resolved and likely infectious.  3. Severe coronary artery calcifications.      ABDOMEN-PELVIS:  1. No metastatic disease in the abdomen or pelvis.    Pathology Results:  I have reviewed the full pathology report recorded in the EMR. The pertinent portions indicating diagnosis are listed here in the note. for details please refer to the full report recorded in the EMR.    I have reviewed the full pathology report recorded in the EMR. The pertinent portions indicating diagnosis are listed here in the note. for details please refer to the full report recorded in the EMR.     Dermatopathology [Jan 26 2023 4:25PM] ([id number])  Specimens: SKIN, L LATERAL BACK   Name JUNIE ROSA     Accession  #: [id number]   Pathologist: KENY LOPEZ MD   Date ofProcedure: 1/24/2023   Date Received: 1/25/2023   Date Reported 1/26/2023   Submitting Physician: BRANDO MOORE DO   Location: [facility] Other External #     FINAL  DIAGNOSIS   SKIN, L LATERAL BACK, SHAVE  BIOPSY:   INVASIVE MALIGNANT MELANOMA, EXTENDING TO A DEPTH OF APPROXIMATELY 1.6 MM (SEE COMMENT):     Comment: The broad shave specimen is sectioned into five portions and reveals a broad severely  atypical compound melanocytic proliferation with areas of tumor regression. The invasive component extends focally to the deep margin at a peripheral margin. See synoptic summary below.     Electronically Signed Out by KENY LOPEZ MD.     Addendum Diagnosis   TEST: BRAF Exon 15 Sequencing   SPECIMEN: FFPE, SKIN LEFT UPPER BACK, [id number] O27   DISEASE DIAGNOSIS: Melanoma   Estimated Tumor Content: 40%   COLLECTION  DATE: 3/6/2023   RECEIVED DATE: 3/20/2023   REPORT DATE: 03/24/2023    RESULT:   BRAF p.V600E ([id number])     INTERPRETATION AND POTENTIAL THERAPEUTIC OPTIONS:   BRAF p.V600E   BRAF V600E mutations are associated with sensitivity to BRAF and MEK   inhibitors.      Assessment and Plan:   Assessment/Plan   Mr. Gabriel Cowan is a 74 y.o. male with a diagnosis of melanoma IIIC on the back. WLE and SLNB 03/06/2023. BRAF positive. Completed one year (9 cycles) of Keytruda 04/04/2024. Please see the evolution of the case listed above in the cancer history.      # Melanoma IIIC  - CT 10/08/2024 showing resolution of prior lung nodule, but multiple new nodules up to 0.4cm. I will follow up on a CT on 01/07/2025. Severe CAD noted. He is following with cardiology.   - Continue surveillance.   - RTC 01/09/2025.  - CT scan 01/07/2025. Labs prior 01/06/2025.  - Continue bobby basin u/s with surgical oncology. Last 04/23/2024. He has another order in, I asked he schedule this today.  - Continue skin checks with dermatology. Next visit 01/14/2025.     # CAD/HTN  - Follows with cardiology and his PCP.     # Fall  - Fell in August when he was trying to get his dog. I placed an order for PT.      DISCLAIMER:   In preparing for this visit and writing this note, I reviewed all the previous electronic medical records  (labs, imaging and medical charts) of the patient available in the physician portal. Significant findings which helped in decision making are recorded  in this chart.     The plan was discussed with the patient. We gave him ample opportunities to ask questions. All questions were answered to his satisfaction and he verbalized understanding.

## 2024-10-24 RX ORDER — HEPARIN SODIUM,PORCINE/PF 10 UNIT/ML
50 SYRINGE (ML) INTRAVENOUS AS NEEDED
OUTPATIENT
Start: 2024-10-24

## 2024-10-24 RX ORDER — HEPARIN 100 UNIT/ML
500 SYRINGE INTRAVENOUS AS NEEDED
OUTPATIENT
Start: 2024-10-24

## 2024-10-29 ENCOUNTER — EVALUATION (OUTPATIENT)
Dept: PHYSICAL THERAPY | Facility: CLINIC | Age: 74
End: 2024-10-29
Payer: MEDICARE

## 2024-10-29 ENCOUNTER — HOSPITAL ENCOUNTER (OUTPATIENT)
Dept: RADIOLOGY | Facility: HOSPITAL | Age: 74
Discharge: HOME | End: 2024-10-29
Payer: MEDICARE

## 2024-10-29 DIAGNOSIS — C77.9 MELANOMA METASTATIC TO LYMPH NODE (MULTI): ICD-10-CM

## 2024-10-29 DIAGNOSIS — Z91.81 HISTORY OF RECENT FALL: ICD-10-CM

## 2024-10-29 PROCEDURE — 76882 US LMTD JT/FCL EVL NVASC XTR: CPT | Performed by: RADIOLOGY

## 2024-10-29 PROCEDURE — 76882 US LMTD JT/FCL EVL NVASC XTR: CPT

## 2024-10-29 PROCEDURE — 97110 THERAPEUTIC EXERCISES: CPT | Mod: GP | Performed by: GENERAL ACUTE CARE HOSPITAL

## 2024-10-29 PROCEDURE — 97162 PT EVAL MOD COMPLEX 30 MIN: CPT | Mod: GP | Performed by: GENERAL ACUTE CARE HOSPITAL

## 2024-11-01 DIAGNOSIS — C77.9 MELANOMA METASTATIC TO LYMPH NODE (MULTI): Primary | ICD-10-CM

## 2024-11-06 PROBLEM — E78.5 HYPERLIPIDEMIA: Status: RESOLVED | Noted: 2023-11-29 | Resolved: 2024-11-06

## 2024-11-06 PROBLEM — I10 ESSENTIAL HYPERTENSION, BENIGN: Status: RESOLVED | Noted: 2023-11-29 | Resolved: 2024-11-06

## 2024-11-06 NOTE — PROGRESS NOTES
Chief Complaint:   No chief complaint on file.     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    Fell and fractured his left sixth rib.  He was sitting on the front porch with his 3-year-old standard poodle.  The lesion was on the dog but he was not holding the leash.  The dog saw to deer across the street and got up to run across the street.  Gabriel tried to grab the leash however tripped and fell.  He fractured the left sixth rib.  He had a very difficult time getting comfortable.  It has been about a month and a half since the injury and he is finally getting back to his routine activity.  He and his wife are still trying to walk several miles each day.    Denies any exertional chest pain or shortness of breath.  Is trying to do more activities to strengthen his core to try to decrease his chances of falling in the future.     Moved to [place] to be closer to his son and daughter-in-law. He had most recently lived in the [place] area but previously had lived in [place] as well as [place].     First PCI to the LAD 2010 in [place]. About a year or so later had a stress test leading to a second PCI (believes that there was was in the inferolateral distribution). Moved to [facility]. Follow-up with the primary care physician who was checking blood work including lipid panel routinely. Says that his LDL was pretty well controlled with atorvastatin and ezetimibe.    Was diagnosed with metastatic melanoma (stage IIIc).  Started on immunotherapy (Keytruda).    Echocardiogram 7/9/2024: EF 55 to 60%.  Moderate to severe left atrial enlargement.  Mild to moderate MR.  RVSP 40 mmHg.    PCI to LCx 3/9/2015: Promus Premier 4 x 12 mm LISSA.  Previously stented LAD was patent.    PCI of LAD 2010 for anterior wall MI.  Ollie 3 mm stent to the mid LAD    Left iliac stent 2009       "  Allergies:  Patient has no known allergies.    Outpatient Medications:  Current Outpatient Medications   Medication Instructions    aspirin 81 mg, Daily    atorvastatin (LIPITOR) 80 mg, oral, Daily    ezetimibe (ZETIA) 10 mg, oral, Nightly    lisinopril 20 mg, oral, Daily    metoprolol succinate XL (TOPROL-XL) 100 mg, oral, Daily, DO NOT CRUSH OR CHEW       Last Recorded Vitals:  Visit Vitals  /70 (BP Location: Left arm, Patient Position: Sitting)   Pulse 66   Ht 1.626 m (5' 4\")   Wt 69.4 kg (153 lb)   SpO2 98%   BMI 26.26 kg/m²   Smoking Status Some Days   BSA 1.77 m²      LASTWT(3):   Wt Readings from Last 3 Encounters:   11/07/24 69.4 kg (153 lb)   10/10/24 72.6 kg (160 lb 1.6 oz)   08/19/24 71.2 kg (157 lb)       Physical Exam:  In general: alert and in no acute distress.   HEENT: Mucous membranes moist, carotid upstrokes normal with no bruits. JVP is normal. No cervical lymphadenopathy. Cranial nerves II-XII grossly intact.  Pulmonary: Clear to auscultation bilaterally.  Cardiovascular: S1,S2, regular. No appreciable murmurs, rubs or gallops.   Lower extremities: Warm. 2+ distal pulses. No edema.  Skin: warm and dry. No obvious rashes visualized.  Psychiatric: Oriented to person, place and time. No obvious agitation.     Last Labs:  CBC -  Recent Labs     10/07/24  0849 07/16/24  0959 04/02/24  1023   WBC 7.2 8.0 7.7   HGB 14.9 16.0 15.6   HCT 44.7 47.9 47.2    145* 160   MCV 99 96 96       CMP -  Recent Labs     10/07/24  0849 07/16/24  0959 04/02/24  1023    132* 139   K 4.3 5.1 4.2    100 105   CO2 29 27 29   ANIONGAP 10 10 9*   BUN 16 15 12   CREATININE 0.94 0.91 0.73   EGFR 84 87 >90     Recent Labs     10/07/24  0849 07/16/24  0959 04/02/24  1023   ALBUMIN 4.2 4.1 4.4   ALKPHOS 72 63 78   ALT 28 33 26   AST 26 25 21   BILITOT 0.8 0.8 0.7       LIPID PANEL -   Recent Labs     11/28/23  1113 08/17/22  1013   CHOL 103 110   LDLF  --  52   HDL 41.5 44.0   TRIG 90 69   LDL 44 lipid " "panel 11/28/2023.    No results for input(s): \"BNP\", \"HGBA1C\" in the last 10427 hours.        Assessment/Plan   1) CAD: PCI to the LAD and LCx in the past.  No signs or symptoms suggesting progression of underlying coronary disease.  No reason to perform routine stress testing.  Echocardiography last year demonstrated normal LV function.  We will continue to observe on medical therapy.  Recommend that he continue his baby aspirin daily.     2) dyslipidemia: LDL very well-controlled and at goal of less than 70.  Continue atorvastatin and ezetimibe.     3) hypertension: Blood pressure well controlled.  Continue lisinopril and metoprolol.     4) follow-up: 1 year or sooner if needed.       Figueroa Perera MD  "

## 2024-11-07 ENCOUNTER — APPOINTMENT (OUTPATIENT)
Dept: CARDIOLOGY | Facility: CLINIC | Age: 74
End: 2024-11-07
Payer: MEDICARE

## 2024-11-07 VITALS
BODY MASS INDEX: 26.12 KG/M2 | SYSTOLIC BLOOD PRESSURE: 122 MMHG | OXYGEN SATURATION: 98 % | WEIGHT: 153 LBS | HEART RATE: 66 BPM | DIASTOLIC BLOOD PRESSURE: 70 MMHG | HEIGHT: 64 IN

## 2024-11-07 DIAGNOSIS — I10 BENIGN ESSENTIAL HYPERTENSION: ICD-10-CM

## 2024-11-07 DIAGNOSIS — E78.5 DYSLIPIDEMIA: ICD-10-CM

## 2024-11-07 DIAGNOSIS — I10 ESSENTIAL (PRIMARY) HYPERTENSION: ICD-10-CM

## 2024-11-07 DIAGNOSIS — I25.10 ARTERIOSCLEROSIS OF CORONARY ARTERY: Primary | Chronic | ICD-10-CM

## 2024-11-07 DIAGNOSIS — E78.5 HYPERLIPIDEMIA, UNSPECIFIED HYPERLIPIDEMIA TYPE: ICD-10-CM

## 2024-11-07 PROCEDURE — 4004F PT TOBACCO SCREEN RCVD TLK: CPT | Performed by: INTERNAL MEDICINE

## 2024-11-07 PROCEDURE — 99214 OFFICE O/P EST MOD 30 MIN: CPT | Performed by: INTERNAL MEDICINE

## 2024-11-07 PROCEDURE — 93005 ELECTROCARDIOGRAM TRACING: CPT | Performed by: INTERNAL MEDICINE

## 2024-11-07 PROCEDURE — 1125F AMNT PAIN NOTED PAIN PRSNT: CPT | Performed by: INTERNAL MEDICINE

## 2024-11-07 PROCEDURE — 93010 ELECTROCARDIOGRAM REPORT: CPT | Performed by: INTERNAL MEDICINE

## 2024-11-07 PROCEDURE — 3078F DIAST BP <80 MM HG: CPT | Performed by: INTERNAL MEDICINE

## 2024-11-07 PROCEDURE — 1160F RVW MEDS BY RX/DR IN RCRD: CPT | Performed by: INTERNAL MEDICINE

## 2024-11-07 PROCEDURE — 1159F MED LIST DOCD IN RCRD: CPT | Performed by: INTERNAL MEDICINE

## 2024-11-07 PROCEDURE — 3074F SYST BP LT 130 MM HG: CPT | Performed by: INTERNAL MEDICINE

## 2024-11-07 RX ORDER — ATORVASTATIN CALCIUM 80 MG/1
80 TABLET, FILM COATED ORAL DAILY
Qty: 90 TABLET | Refills: 3 | Status: SHIPPED | OUTPATIENT
Start: 2024-11-07 | End: 2025-11-07

## 2024-11-07 RX ORDER — METOPROLOL SUCCINATE 100 MG/1
100 TABLET, EXTENDED RELEASE ORAL DAILY
Qty: 90 TABLET | Refills: 3 | Status: SHIPPED | OUTPATIENT
Start: 2024-11-07 | End: 2025-11-07

## 2024-11-07 RX ORDER — ASPIRIN 81 MG/1
81 TABLET ORAL DAILY
Qty: 90 TABLET | Refills: 3 | Status: SHIPPED | OUTPATIENT
Start: 2024-11-07 | End: 2025-11-07

## 2024-11-07 RX ORDER — EZETIMIBE 10 MG/1
10 TABLET ORAL NIGHTLY
Qty: 90 TABLET | Refills: 3 | Status: SHIPPED | OUTPATIENT
Start: 2024-11-07

## 2024-11-07 RX ORDER — LISINOPRIL 20 MG/1
20 TABLET ORAL DAILY
Qty: 90 TABLET | Refills: 3 | Status: SHIPPED | OUTPATIENT
Start: 2024-11-07 | End: 2025-11-07

## 2024-11-07 ASSESSMENT — PAIN SCALES - GENERAL: PAINLEVEL_OUTOF10: 2

## 2024-11-07 ASSESSMENT — ENCOUNTER SYMPTOMS
LOSS OF SENSATION IN FEET: 0
OCCASIONAL FEELINGS OF UNSTEADINESS: 0
DEPRESSION: 0

## 2025-01-06 ENCOUNTER — LAB (OUTPATIENT)
Dept: LAB | Facility: CLINIC | Age: 75
End: 2025-01-06
Payer: MEDICARE

## 2025-01-06 DIAGNOSIS — R91.8 MULTIPLE LUNG NODULES ON CT: ICD-10-CM

## 2025-01-06 DIAGNOSIS — C77.9 MELANOMA METASTATIC TO LYMPH NODE (MULTI): ICD-10-CM

## 2025-01-06 LAB
ALBUMIN SERPL BCP-MCNC: 4.4 G/DL (ref 3.4–5)
ALP SERPL-CCNC: 84 U/L (ref 33–136)
ALT SERPL W P-5'-P-CCNC: 23 U/L (ref 10–52)
ANION GAP SERPL CALC-SCNC: 12 MMOL/L (ref 10–20)
AST SERPL W P-5'-P-CCNC: 20 U/L (ref 9–39)
BASOPHILS # BLD AUTO: 0.03 X10*3/UL (ref 0–0.1)
BASOPHILS NFR BLD AUTO: 0.4 %
BILIRUB SERPL-MCNC: 0.8 MG/DL (ref 0–1.2)
BUN SERPL-MCNC: 13 MG/DL (ref 6–23)
CALCIUM SERPL-MCNC: 9.1 MG/DL (ref 8.6–10.3)
CHLORIDE SERPL-SCNC: 103 MMOL/L (ref 98–107)
CO2 SERPL-SCNC: 29 MMOL/L (ref 21–32)
CREAT SERPL-MCNC: 0.87 MG/DL (ref 0.5–1.3)
EGFRCR SERPLBLD CKD-EPI 2021: 89 ML/MIN/1.73M*2
EOSINOPHIL # BLD AUTO: 0.06 X10*3/UL (ref 0–0.4)
EOSINOPHIL NFR BLD AUTO: 0.9 %
ERYTHROCYTE [DISTWIDTH] IN BLOOD BY AUTOMATED COUNT: 12.7 % (ref 11.5–14.5)
GLUCOSE SERPL-MCNC: 112 MG/DL (ref 74–99)
HCT VFR BLD AUTO: 48.8 % (ref 41–52)
HGB BLD-MCNC: 16.3 G/DL (ref 13.5–17.5)
IMM GRANULOCYTES # BLD AUTO: 0.01 X10*3/UL (ref 0–0.5)
IMM GRANULOCYTES NFR BLD AUTO: 0.1 % (ref 0–0.9)
LDH SERPL L TO P-CCNC: 144 U/L (ref 84–246)
LYMPHOCYTES # BLD AUTO: 1.78 X10*3/UL (ref 0.8–3)
LYMPHOCYTES NFR BLD AUTO: 25.2 %
MCH RBC QN AUTO: 32.5 PG (ref 26–34)
MCHC RBC AUTO-ENTMCNC: 33.4 G/DL (ref 32–36)
MCV RBC AUTO: 97 FL (ref 80–100)
MONOCYTES # BLD AUTO: 0.72 X10*3/UL (ref 0.05–0.8)
MONOCYTES NFR BLD AUTO: 10.2 %
NEUTROPHILS # BLD AUTO: 4.45 X10*3/UL (ref 1.6–5.5)
NEUTROPHILS NFR BLD AUTO: 63.2 %
PLATELET # BLD AUTO: 165 X10*3/UL (ref 150–450)
POTASSIUM SERPL-SCNC: 4.5 MMOL/L (ref 3.5–5.3)
PROT SERPL-MCNC: 6.5 G/DL (ref 6.4–8.2)
RBC # BLD AUTO: 5.01 X10*6/UL (ref 4.5–5.9)
SODIUM SERPL-SCNC: 139 MMOL/L (ref 136–145)
WBC # BLD AUTO: 7.1 X10*3/UL (ref 4.4–11.3)

## 2025-01-06 PROCEDURE — 84075 ASSAY ALKALINE PHOSPHATASE: CPT

## 2025-01-06 PROCEDURE — 36415 COLL VENOUS BLD VENIPUNCTURE: CPT

## 2025-01-06 PROCEDURE — 85025 COMPLETE CBC W/AUTO DIFF WBC: CPT

## 2025-01-06 PROCEDURE — 83615 LACTATE (LD) (LDH) ENZYME: CPT

## 2025-01-07 ENCOUNTER — HOSPITAL ENCOUNTER (OUTPATIENT)
Dept: RADIOLOGY | Facility: HOSPITAL | Age: 75
Discharge: HOME | End: 2025-01-07
Payer: MEDICARE

## 2025-01-07 DIAGNOSIS — R91.8 MULTIPLE LUNG NODULES ON CT: ICD-10-CM

## 2025-01-07 DIAGNOSIS — C77.9 MELANOMA METASTATIC TO LYMPH NODE (MULTI): ICD-10-CM

## 2025-01-07 PROCEDURE — 74177 CT ABD & PELVIS W/CONTRAST: CPT

## 2025-01-07 PROCEDURE — 2550000001 HC RX 255 CONTRASTS: Performed by: NURSE PRACTITIONER

## 2025-01-07 RX ADMIN — IOHEXOL 75 ML: 350 INJECTION, SOLUTION INTRAVENOUS at 09:45

## 2025-01-09 ENCOUNTER — OFFICE VISIT (OUTPATIENT)
Dept: HEMATOLOGY/ONCOLOGY | Facility: CLINIC | Age: 75
End: 2025-01-09
Payer: MEDICARE

## 2025-01-09 VITALS
RESPIRATION RATE: 16 BRPM | SYSTOLIC BLOOD PRESSURE: 137 MMHG | BODY MASS INDEX: 26.21 KG/M2 | DIASTOLIC BLOOD PRESSURE: 82 MMHG | WEIGHT: 152.7 LBS | HEART RATE: 103 BPM | TEMPERATURE: 97.5 F | OXYGEN SATURATION: 95 %

## 2025-01-09 DIAGNOSIS — C77.9 MELANOMA METASTATIC TO LYMPH NODE (MULTI): ICD-10-CM

## 2025-01-09 DIAGNOSIS — R91.8 MULTIPLE LUNG NODULES ON CT: ICD-10-CM

## 2025-01-09 PROCEDURE — 1160F RVW MEDS BY RX/DR IN RCRD: CPT | Performed by: NURSE PRACTITIONER

## 2025-01-09 PROCEDURE — 99215 OFFICE O/P EST HI 40 MIN: CPT | Performed by: NURSE PRACTITIONER

## 2025-01-09 PROCEDURE — 3075F SYST BP GE 130 - 139MM HG: CPT | Performed by: NURSE PRACTITIONER

## 2025-01-09 PROCEDURE — 1126F AMNT PAIN NOTED NONE PRSNT: CPT | Performed by: NURSE PRACTITIONER

## 2025-01-09 PROCEDURE — 1159F MED LIST DOCD IN RCRD: CPT | Performed by: NURSE PRACTITIONER

## 2025-01-09 PROCEDURE — 3079F DIAST BP 80-89 MM HG: CPT | Performed by: NURSE PRACTITIONER

## 2025-01-09 ASSESSMENT — ENCOUNTER SYMPTOMS
NEUROLOGICAL NEGATIVE: 1
ENDOCRINE NEGATIVE: 1
PSYCHIATRIC NEGATIVE: 1
CARDIOVASCULAR NEGATIVE: 1
EYES NEGATIVE: 1
CONSTITUTIONAL NEGATIVE: 1
HEMATOLOGIC/LYMPHATIC NEGATIVE: 1
GASTROINTESTINAL NEGATIVE: 1
RESPIRATORY NEGATIVE: 1

## 2025-01-09 ASSESSMENT — PAIN SCALES - GENERAL: PAINLEVEL_OUTOF10: 0-NO PAIN

## 2025-01-09 NOTE — PROGRESS NOTES
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  Referring Physician: Brisa Walden, APRN-CNP  [address] [place] [address]  Primary Care Provider: Montana Hernandez DO     Oncology follow up     HPI   Follow-up    Melanoma       Follow up imaging results and immunotherapy.    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 He met with our team on 4/6/23.      Referring Surgeon: Dr. Kp Kilgore  Dermatologist: Yin Cano DO   Date of first meeting with our team: 04/06/2023     Date of First meeting with surgical team: 02/14/2023  Melanoma type: Cutaneous- Superficial Spreading type  Location of primary site: Left Upper back  Date of WLE and SLNB: 3/6/23  Final pathology: Breslow Depth- 1.6 mm; Ulceration status- Negative; LVI- No; Other high risk features- Microsatelitosis present.  [place] lymph node status: 2/2 positive. Largest deposit 9mm. No JUAN.  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 WLE and SLNB completed on 3/6/23. He was staged at IIIC melanoma. He started keytruda on 4/27/2023 at 400mg IV dose. Surveillance CT scans 01/09/24 did not show recurrence. Completed one year of Keytruda 04/04/2024 and is currently on surveillance.      Treatment History:   Systemic therapy.   1. Keytruda 400mg (adjuvant setting)  C1- 04/27/2023, C2- 06/08/2023, C3- 07/20/2023, C4- 08/31/2023, C5- 10/12/2023, C6- 11/30/2023  C7- 01/11/2024, C8- 02/22/2024, C9- 04/04/2024    Subjective   Please refer to \"Notes/Cancer History\" above for complete History of present illness.     Mr Gabriel Cowan     - Presents to clinic alone.   - No new symptoms.   - Continues to walk 5 miles 5 times weekly.   - Continues to enjoy time with his grandson.  - " Continues skin checks with dermatology and bobby basin ultrasound with surgical oncology.      Review of Systems:   Review of Systems   Constitutional: Negative.    HENT:  Negative.     Eyes: Negative.    Respiratory: Negative.     Cardiovascular: Negative.    Gastrointestinal: Negative.    Endocrine: Negative.    Genitourinary: Negative.     Musculoskeletal:         Broken ribs 08/2024   Skin: Negative.    Neurological: Negative.    Hematological: Negative.    Psychiatric/Behavioral: Negative.           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)        FAMILY HISTORY  Family History   Problem Relation Name Age of Onset    No Known Problems Mother      No Known Problems Father      No Known Problems Sister      No Known Problems Brother         TOBACCO HISTORY  Tobacco Use: High Risk (11/7/2024)    Patient History     Smoking Tobacco Use: Some Days     Smokeless Tobacco Use: Never     Passive Exposure: Never       SOCIAL HISTORY  Social Connections: Not on file   Retired ER physician, , lives with his wife     Outpatient Medication Profile:  Current Outpatient Medications on File Prior to Visit   Medication Sig Dispense Refill    aspirin 81 mg EC tablet Take 1 tablet (81 mg) by mouth once daily. 90 tablet 3    atorvastatin (Lipitor) 80 mg tablet Take 1 tablet (80 mg) by mouth once daily. 90 tablet 3    ezetimibe (Zetia) 10 mg tablet Take 1 tablet (10 mg) by mouth once daily at bedtime. 90 tablet 3    lisinopril 20 mg tablet Take 1 tablet (20 mg) by mouth once daily. 90 tablet 3    metoprolol succinate XL (Toprol-XL) 100 mg 24 hr tablet Take 1 tablet (100 mg) by mouth once daily. DO NOT CRUSH OR CHEW 90 tablet 3     No current facility-administered medications on file prior to visit.         Performance Status:  Asymptomatic     Vitals and  Measurements:   Vitals:    01/09/25 1020   BP: 137/82   Pulse: 103   Resp: 16   Temp: 36.4 °C (97.5 °F)   TempSrc: Temporal   SpO2: 95%   Weight: 69.3 kg (152 lb 11.2 oz)   PainSc: 0-No pain       Physical Exam:   Physical Exam  Constitutional:       Appearance: Normal appearance.   HENT:      Head: Normocephalic and atraumatic.      Nose: Nose normal.      Mouth/Throat:      Mouth: Mucous membranes are moist.      Pharynx: Oropharynx is clear.   Eyes:      Conjunctiva/sclera: Conjunctivae normal.   Cardiovascular:      Rate and Rhythm: Normal rate and regular rhythm.      Pulses: Normal pulses.      Heart sounds: Normal heart sounds.   Pulmonary:      Effort: Pulmonary effort is normal.      Breath sounds: Normal breath sounds.   Abdominal:      General: Bowel sounds are normal.      Palpations: Abdomen is soft.   Musculoskeletal:         General: Normal range of motion.      Cervical back: Neck supple.   Skin:     General: Skin is warm and dry.      Comments: Well healed incision on back without nodules    Neurological:      General: No focal deficit present.      Mental Status: He is alert and oriented to person, place, and time.   Psychiatric:         Mood and Affect: Mood normal.         Behavior: Behavior normal.      Lab Results:  I have reviewed these laboratory results:     Lab Results   Component Value Date    WBC 7.1 01/06/2025    HGB 16.3 01/06/2025    HCT 48.8 01/06/2025    MCV 97 01/06/2025     01/06/2025         Chemistry    Lab Results   Component Value Date/Time     01/06/2025 1056    K 4.5 01/06/2025 1056     01/06/2025 1056    CO2 29 01/06/2025 1056    BUN 13 01/06/2025 1056    CREATININE 0.87 01/06/2025 1056    Lab Results   Component Value Date/Time    CALCIUM 9.1 01/06/2025 1056    ALKPHOS 84 01/06/2025 1056    AST 20 01/06/2025 1056    ALT 23 01/06/2025 1056    BILITOT 0.8 01/06/2025 1056            Lab Results   Component Value Date    TSH 1.96 07/16/2024        Radiology  Result:  I have reviewed the latest Imaging in PACS and the findings are noted in this note. I discussed the results of the latest imaging with the patient. All previous imaging were reviewed at the time it was completed. Full records are available in the EMR for review as well.     CT CHEST ABDOMEN PELVIS W IV CONTRAST; 1/7/2025 10:01 am     IMPRESSION:  Melanoma restaging scan, in comparison to prior CT from October 2024:  1. No new site of disease in the chest, abdomen and pelvis.  2. Near-complete resolution of the previously noted scattered sub 5  mm pulmonary nodules, likely reflecting infectious/inflammatory  etiology.  3. Additional chronic and incidental findings as detailed above.   ====================================================  US EXTREMITY NONVASCULAR REAL TIME WITH IMAGE DOCUMENTATION LIMITED  ANATOMIC SPECIFIC;  10/29/2024 12:10 pm    FINDINGS:  Multiple nonspecific left axillary lymph nodes are again seen which  maintain more benign sonographic morphology with hyperechoic emi and  peripheral hypoechoic non thickened cortices. Largest individual node  measures 2.3 x 0.8 x 2.4 cm consisting mainly of a hyperechoic/fatty  hilum. Otherwise nonspecific soft tissues are demonstrated.      IMPRESSION:  Multiple nonspecific left axillary lymph nodes which maintain a more  benign sonographic morphology.     Pathology Results:  I have reviewed the full pathology report recorded in the EMR. The pertinent portions indicating diagnosis are listed here in the note. for details please refer to the full report recorded in the EMR.    I have reviewed the full pathology report recorded in the EMR. The pertinent portions indicating diagnosis are listed here in the note. for details please refer to the full report recorded in the EMR.     Dermatopathology [Jan 26 2023 4:25PM] ([id number])  Specimens: SKIN, L LATERAL BACK   Name JUNIE ROSA     Accession  #: [id number]   Pathologist: KENY LOPEZ MD    Date ofProcedure: 1/24/2023   Date Received: 1/25/2023   Date Reported 1/26/2023   Submitting Physician: BRANDO MOORE DO   Location: [facility] Other External #     FINAL  DIAGNOSIS   SKIN, L LATERAL BACK, SHAVE BIOPSY:   INVASIVE MALIGNANT MELANOMA, EXTENDING TO A DEPTH OF APPROXIMATELY 1.6 MM (SEE COMMENT):     Comment: The broad shave specimen is sectioned into five portions and reveals a broad severely  atypical compound melanocytic proliferation with areas of tumor regression. The invasive component extends focally to the deep margin at a peripheral margin. See synoptic summary below.     Electronically Signed Out by KENY LOPEZ MD.     Addendum Diagnosis   TEST: BRAF Exon 15 Sequencing   SPECIMEN: FFPE, SKIN LEFT UPPER BACK, [id number] I69   DISEASE DIAGNOSIS: Melanoma   Estimated Tumor Content: 40%   COLLECTION  DATE: 3/6/2023   RECEIVED DATE: 3/20/2023   REPORT DATE: 03/24/2023    RESULT:   BRAF p.V600E ([id number])     INTERPRETATION AND POTENTIAL THERAPEUTIC OPTIONS:   BRAF p.V600E   BRAF V600E mutations are associated with sensitivity to BRAF and MEK   inhibitors.      Assessment and Plan:   Assessment/Plan   Mr. Gabriel Cowan is a 74 y.o. male with a diagnosis of melanoma IIIC on the back. WLE and SLNB 03/06/2023. BRAF positive. Completed one year (9 cycles) of Keytruda 04/04/2024. Please see the evolution of the case listed above in the cancer history.      # Melanoma IIIC  - CT 01/07/2025 showing stable changes and MARBIN. Resolution of prior lung nodules. Severe CAD noted. He is following with cardiology.   - Continue surveillance.   - RTC 04/10/2025.  - CT scan 04/07/2025. Labs prior 04/04/2025.  - Continue bobby basin u/s with surgical oncology. Last 10/29/2024.  - Continue skin checks with dermatology. Next visit 01/14/2025.     # CAD/HTN  - Follows with cardiology. Next visit 11/11/2025.     DISCLAIMER:   In preparing for this visit and writing this note, I reviewed all the previous  electronic medical records (labs, imaging and medical charts) of the patient available in the physician portal. Significant findings which helped in decision making are recorded  in this chart.     The plan was discussed with the patient. We gave him ample opportunities to ask questions. All questions were answered to his satisfaction and he verbalized understanding.

## 2025-01-14 ENCOUNTER — APPOINTMENT (OUTPATIENT)
Dept: DERMATOLOGY | Facility: CLINIC | Age: 75
End: 2025-01-14
Payer: MEDICARE

## 2025-02-18 ENCOUNTER — APPOINTMENT (OUTPATIENT)
Dept: DERMATOLOGY | Facility: CLINIC | Age: 75
End: 2025-02-18
Payer: MEDICARE

## 2025-02-18 DIAGNOSIS — Z85.820 PERSONAL HISTORY OF MALIGNANT MELANOMA OF SKIN: ICD-10-CM

## 2025-02-18 DIAGNOSIS — D18.01 HEMANGIOMA OF SKIN: ICD-10-CM

## 2025-02-18 DIAGNOSIS — L82.1 SEBORRHEIC KERATOSIS: Primary | ICD-10-CM

## 2025-02-18 DIAGNOSIS — L57.8 PHOTOAGING OF SKIN: ICD-10-CM

## 2025-02-18 DIAGNOSIS — Z12.83 ENCOUNTER FOR SCREENING FOR MALIGNANT NEOPLASM OF SKIN: ICD-10-CM

## 2025-02-18 PROCEDURE — G2211 COMPLEX E/M VISIT ADD ON: HCPCS | Performed by: DERMATOLOGY

## 2025-02-18 PROCEDURE — 99213 OFFICE O/P EST LOW 20 MIN: CPT | Performed by: DERMATOLOGY

## 2025-02-18 NOTE — PROGRESS NOTES
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 Hx of melanoma.  Pt is  concerned about a new spot on the anterior neck.).     Patient follows with Heme/Onc - last visit 1/9/2025    Review of Systems:  No other skin or systemic complaints other than what is documented elsewhere in the note.    The following portions of the chart were reviewed this encounter and updated as appropriate:         Skin Cancer History  History of malignant melanoma - Stage IIIC   Date of WLE and SLNB: 3/6/23  Final pathology: Breslow Depth- 1.6 mm; Ulceration status- Negative; LVI- No; Other high risk features- Microsatelitosis present.  [place] lymph node status: 2/2 positive. Largest deposit 9mm. No JUAN.  Final Stage: [id number] (MSI +)- IIIC  MRI negative 4/4/23  Full body PET 4/4/23 - unremarkable  On Keytruda 4/27/23 - 4/4/2024 and is currently on surveillance    CT scan 04/07/2025   Ultrasound of nodes 5/1/25      Specialty Problems          Dermatology Problems    Hemangioma of skin and subcutaneous tissue    Melanocytic nevi of trunk    Neoplasm of uncertain behavior of skin    Other seborrheic keratosis    Other skin changes due to chronic exposure to nonionizing radiation    Personal history of malignant melanoma of skin    Malignant melanoma of skin of back (Multi)    Skin lesion of back    Melanoma metastatic to lymph node (Multi)        Objective   Well appearing patient in no apparent distress; mood and affect are within normal limits.    A full examination was performed including scalp, head, eyes, ears, nose, lips, neck, chest, axillae, abdomen, back, buttocks, bilateral upper extremities, bilateral lower extremities, hands, feet, fingers, toes, fingernails, and toenails. Declined examination of genitals. All findings within normal limits unless otherwise noted below.  No axillary, cervical or inguinal adenopathy noted one examination today.     Assessment/Plan   1.  Seborrheic keratosis (5)  Left Lower Back (2), Left Upper Back, Neck - Anterior, Right Upper Back  Brown, tan waxy macules and stuck on appearing papules and plaques    The benign nature of these skin lesions reviewed, reassure provided and no further treatment needed at this time.   These lesions can be removed, if symptomatic (itching, bleeding, rubbing on clothing, painful), otherwise removal is considered cosmetic.     Related Procedures  Follow Up In Dermatology - Established Patient    2. Personal history of malignant melanoma of skin  Left flank  Well healed scar at site of prior treatment without evidence of recurrence.    There is no evidence of recurrence or repigmentation on clinical examination today, reassure was provided to the patient. The importance of sun protection was reviewed with the patient including the use of a broad spectrum sunscreen that protects against both UVA/UVB rays, with ingredients such as Zinc oxide or titanium dioxide, wearing sun protective clothing and sun avoidance. ABCDEs of melanoma reviewed. Patient to f/u should they notice any new or changing pre-existing skin lesion. The patient was advised to follow up 6 months for FBSE due to history of melanoma.    Related Procedures  Follow Up In Dermatology - Established Patient    3. Photoaging of skin  Mottled pigmentation with telangiectasias and brown reticular macules in sun exposed areas of the body.    The risk of chronic, cumulative sun damage and risk of development of skin cancer was reviewed today.   The importance of sun protection was reviewed: including the use of a broad spectrum sunscreen of at least SPF 30 that protects against both UVA/UVB rays, with ingredients such as Zinc oxide or titanium dioxide, wearing sun protective clothing and sun avoidance. We reviewed the warning signs of non-melanoma skin cancer and ABCDEs of melanoma  Please follow up should you notice any new or changing pre-existing skin  lesion.    4. Hemangioma of skin  Cherry red papules    The benign nature of these skin lesions were reviewed, no treatment is necessary.   Please follow up for any new or pre-existing lesion that is changing in size, shape, color, becomes painful, tender, itches or bleed.    5. Encounter for screening for malignant neoplasm of skin  No suspicious lesions noted on examination today    The risk of chronic, cumulative sun damage and risk of development of skin cancer was reviewed today.   The importance of sun protection was reviewed: including the use of a broad spectrum sunscreen of at least SPF 30 that protects against both UVA/UVB rays, with ingredients such as Zinc oxide or titanium dioxide, wearing sun protective clothing and sun avoidance. We reviewed the warning signs of non-melanoma skin cancer and ABCDEs of melanoma  Please follow up should you notice any new or changing pre-existing skin lesion.      Follow up in 6 months for FBSE

## 2025-04-07 ENCOUNTER — APPOINTMENT (OUTPATIENT)
Dept: RADIOLOGY | Facility: HOSPITAL | Age: 75
End: 2025-04-07
Payer: MEDICARE

## 2025-04-07 ENCOUNTER — LAB (OUTPATIENT)
Dept: LAB | Facility: CLINIC | Age: 75
End: 2025-04-07
Payer: MEDICARE

## 2025-04-07 DIAGNOSIS — C77.9 MELANOMA METASTATIC TO LYMPH NODE (MULTI): ICD-10-CM

## 2025-04-07 LAB
ALBUMIN SERPL BCP-MCNC: 4.4 G/DL (ref 3.4–5)
ALP SERPL-CCNC: 64 U/L (ref 33–136)
ALT SERPL W P-5'-P-CCNC: 23 U/L (ref 10–52)
ANION GAP SERPL CALC-SCNC: 15 MMOL/L (ref 10–20)
AST SERPL W P-5'-P-CCNC: 24 U/L (ref 9–39)
BASOPHILS # BLD AUTO: 0.04 X10*3/UL (ref 0–0.1)
BASOPHILS NFR BLD AUTO: 0.4 %
BILIRUB SERPL-MCNC: 0.9 MG/DL (ref 0–1.2)
BUN SERPL-MCNC: 15 MG/DL (ref 6–23)
CALCIUM SERPL-MCNC: 9.3 MG/DL (ref 8.6–10.3)
CHLORIDE SERPL-SCNC: 103 MMOL/L (ref 98–107)
CO2 SERPL-SCNC: 26 MMOL/L (ref 21–32)
CREAT SERPL-MCNC: 1.06 MG/DL (ref 0.5–1.3)
EGFRCR SERPLBLD CKD-EPI 2021: 73 ML/MIN/1.73M*2
EOSINOPHIL # BLD AUTO: 0.03 X10*3/UL (ref 0–0.4)
EOSINOPHIL NFR BLD AUTO: 0.3 %
ERYTHROCYTE [DISTWIDTH] IN BLOOD BY AUTOMATED COUNT: 13.8 % (ref 11.5–14.5)
GLUCOSE SERPL-MCNC: 104 MG/DL (ref 74–99)
HCT VFR BLD AUTO: 47.8 % (ref 41–52)
HGB BLD-MCNC: 16 G/DL (ref 13.5–17.5)
IMM GRANULOCYTES # BLD AUTO: 0.02 X10*3/UL (ref 0–0.5)
IMM GRANULOCYTES NFR BLD AUTO: 0.2 % (ref 0–0.9)
LDH SERPL L TO P-CCNC: 181 U/L (ref 84–246)
LYMPHOCYTES # BLD AUTO: 1.29 X10*3/UL (ref 0.8–3)
LYMPHOCYTES NFR BLD AUTO: 14.3 %
MCH RBC QN AUTO: 32.4 PG (ref 26–34)
MCHC RBC AUTO-ENTMCNC: 33.5 G/DL (ref 32–36)
MCV RBC AUTO: 97 FL (ref 80–100)
MONOCYTES # BLD AUTO: 0.93 X10*3/UL (ref 0.05–0.8)
MONOCYTES NFR BLD AUTO: 10.3 %
NEUTROPHILS # BLD AUTO: 6.68 X10*3/UL (ref 1.6–5.5)
NEUTROPHILS NFR BLD AUTO: 74.5 %
PLATELET # BLD AUTO: 172 X10*3/UL (ref 150–450)
POTASSIUM SERPL-SCNC: 4.7 MMOL/L (ref 3.5–5.3)
PROT SERPL-MCNC: 6.6 G/DL (ref 6.4–8.2)
RBC # BLD AUTO: 4.94 X10*6/UL (ref 4.5–5.9)
SODIUM SERPL-SCNC: 139 MMOL/L (ref 136–145)
WBC # BLD AUTO: 9 X10*3/UL (ref 4.4–11.3)

## 2025-04-07 PROCEDURE — 36415 COLL VENOUS BLD VENIPUNCTURE: CPT

## 2025-04-07 PROCEDURE — 85025 COMPLETE CBC W/AUTO DIFF WBC: CPT

## 2025-04-07 PROCEDURE — 84075 ASSAY ALKALINE PHOSPHATASE: CPT

## 2025-04-07 PROCEDURE — 83615 LACTATE (LD) (LDH) ENZYME: CPT

## 2025-04-08 ENCOUNTER — HOSPITAL ENCOUNTER (OUTPATIENT)
Dept: RADIOLOGY | Facility: HOSPITAL | Age: 75
Discharge: HOME | End: 2025-04-08
Payer: MEDICARE

## 2025-04-08 DIAGNOSIS — C77.9 MELANOMA METASTATIC TO LYMPH NODE (MULTI): ICD-10-CM

## 2025-04-08 PROCEDURE — 71260 CT THORAX DX C+: CPT

## 2025-04-08 PROCEDURE — 2550000001 HC RX 255 CONTRASTS: Performed by: NURSE PRACTITIONER

## 2025-04-08 RX ADMIN — IOHEXOL 75 ML: 350 INJECTION, SOLUTION INTRAVENOUS at 09:45

## 2025-04-10 ENCOUNTER — OFFICE VISIT (OUTPATIENT)
Dept: HEMATOLOGY/ONCOLOGY | Facility: CLINIC | Age: 75
End: 2025-04-10
Payer: MEDICARE

## 2025-04-10 VITALS
BODY MASS INDEX: 27.24 KG/M2 | OXYGEN SATURATION: 98 % | SYSTOLIC BLOOD PRESSURE: 129 MMHG | WEIGHT: 158.7 LBS | RESPIRATION RATE: 16 BRPM | TEMPERATURE: 96.4 F | DIASTOLIC BLOOD PRESSURE: 84 MMHG | HEART RATE: 67 BPM

## 2025-04-10 DIAGNOSIS — C77.9 MELANOMA METASTATIC TO LYMPH NODE (MULTI): ICD-10-CM

## 2025-04-10 DIAGNOSIS — C43.59 MALIGNANT MELANOMA OF UPPER BACK (MULTI): Primary | ICD-10-CM

## 2025-04-10 DIAGNOSIS — R91.8 MULTIPLE LUNG NODULES ON CT: ICD-10-CM

## 2025-04-10 PROCEDURE — 1160F RVW MEDS BY RX/DR IN RCRD: CPT | Performed by: INTERNAL MEDICINE

## 2025-04-10 PROCEDURE — 3074F SYST BP LT 130 MM HG: CPT | Performed by: INTERNAL MEDICINE

## 2025-04-10 PROCEDURE — G2211 COMPLEX E/M VISIT ADD ON: HCPCS | Performed by: INTERNAL MEDICINE

## 2025-04-10 PROCEDURE — 1126F AMNT PAIN NOTED NONE PRSNT: CPT | Performed by: INTERNAL MEDICINE

## 2025-04-10 PROCEDURE — 99214 OFFICE O/P EST MOD 30 MIN: CPT | Performed by: INTERNAL MEDICINE

## 2025-04-10 PROCEDURE — 3079F DIAST BP 80-89 MM HG: CPT | Performed by: INTERNAL MEDICINE

## 2025-04-10 PROCEDURE — 1159F MED LIST DOCD IN RCRD: CPT | Performed by: INTERNAL MEDICINE

## 2025-04-10 ASSESSMENT — ENCOUNTER SYMPTOMS
HOT FLASHES: 0
ADENOPATHY: 0
CHILLS: 0
PSYCHIATRIC NEGATIVE: 1
ENDOCRINE NEGATIVE: 1
LEG SWELLING: 0
PALPITATIONS: 0
HEMATURIA: 0
EYE PROBLEMS: 0
EYES NEGATIVE: 1
TROUBLE SWALLOWING: 0
ARTHRALGIAS: 0
APPETITE CHANGE: 0
FLANK PAIN: 0
HEMATOLOGIC/LYMPHATIC NEGATIVE: 1
VOMITING: 0
NEUROLOGICAL NEGATIVE: 1
CONSTIPATION: 0
BLOOD IN STOOL: 0
LIGHT-HEADEDNESS: 0
MYALGIAS: 0
CONSTITUTIONAL NEGATIVE: 1
CONFUSION: 0
SORE THROAT: 0
SEIZURES: 0
DIZZINESS: 0
NECK PAIN: 0
HEMOPTYSIS: 0
DIARRHEA: 0
VOICE CHANGE: 0
SCLERAL ICTERUS: 0
BRUISES/BLEEDS EASILY: 0
RESPIRATORY NEGATIVE: 1
DYSURIA: 0
CHEST TIGHTNESS: 0
DEPRESSION: 0
EXTREMITY WEAKNESS: 0
BACK PAIN: 0
SHORTNESS OF BREATH: 0
FREQUENCY: 0
FATIGUE: 0
NAUSEA: 0
CARDIOVASCULAR NEGATIVE: 1
DIFFICULTY URINATING: 0
GASTROINTESTINAL NEGATIVE: 1
ABDOMINAL PAIN: 0
NUMBNESS: 0
NERVOUS/ANXIOUS: 0
FEVER: 0

## 2025-04-10 ASSESSMENT — PAIN SCALES - GENERAL: PAINLEVEL_OUTOF10: 0-NO PAIN

## 2025-04-10 NOTE — PROGRESS NOTES
".Patient ID: Gabriel Cowan \"DrShelly\" is a 74 y.o. male.  Referring Physician: Brisa Walden, APRN-CNP  [address]  [place] [id number]  Primary Care Provider: No Assigned PCP Generic Provider, MD     Chief Complaint   Patient presents with    Follow-up     Review CT scans as part of surveillance    Skin Cancer     Stage IIIc melanoma follow-up      Oncology History Overview Note   Dr. Gabriel Cowan is diagnosed with stage IIIc melanoma.  He underwent wide local excision and sentinel lymph node biopsy on 3/6/2023 which showed a 1.6 mm deep nonulcerated melanoma.  The pathology also showed positive microsatellitosis.  2 sentinel lymph node were removed and both were positive with the largest deposit being 9 mm.  The final stage of the melanoma was [id number]- Stage IIIC (AJCC 8th Ed.).  He took 1 year of adjuvant immunotherapy with pembrolizumab from 4/27/2023 till 4/4/2024.  He has been on surveillance since then and surveillance scans have not detected any recurrence.     Malignant melanoma of skin of back (Multi)   2/14/2023 Cancer Staged    Staging form: Melanoma of the Skin, AJCC 8th Edition, Pathologic stage from 2/14/2023: Stage IIIC (pT2a, pN2c, cM0) - Signed by Christopher Iglesias MD on 4/10/2025     4/27/2023 - 4/4/2024 Chemotherapy    Pembrolizumab, 42 Day Cycles     8/30/2023 Initial Diagnosis    Referring Surgeon: Dr. Kp Kilgore  Dermatologist: Yin Cano DO   Date of first meeting with our team: 04/06/2023     Date of First meeting with surgical team: 02/14/2023  Melanoma type: Cutaneous- Superficial Spreading type  Location of primary site: Left Upper back  Date of WLE and SLNB: 3/6/23  Final pathology: B[place]low Depth- 1.6 mm; Ulceration status- Negative; LVI- No; Other high risk features- Microsatelitosis present.  [place] lymph node status: 2/2 positive. Largest deposit 9mm. No JUAN.  Final Stage: [id number] (MSI +)- IIIC     BRAF status: [id number] E positive.  Initial MRI brain with and without " "contrast: 04/04/23- Unremarkable  Initial Full body PET scan: 04/04/23- Unremarkable        Summary:  Dr. Cowan has a stage IIIC melanoma diagnosed in early 2023. WLE and SLNB completed on 3/6/23. He was staged at IIIC melanoma. He started keytruda on 4/27/2023 at 400mg IV dose. Surveillance CT scans 01/09/24 did not show recurrence. Completed one year of Keytruda 04/04/2024 and is currently on surveillance.     Surveillance CT scans  07/17/2023-unremarkable  10/9/2023-unremarkable  01/09/2024-unremarkable  4/16/2024-unremarkable  7/16/2024-unremarkable  10/8/2024-unremarkable  01/07/2025-unremarkable  04/08/2025-unremarkable    Treatment History:   Systemic therapy.   1. Keytruda 400mg (adjuvant setting)  C1- 04/27/2023, C2- 06/08/2023, C3- 07/20/2023, C4- 08/31/2023, C5- 10/12/2023, C6- 11/30/2023  C7- 01/11/2024, C8- 02/22/2024, C9- 04/04/2024      Subjective   Please refer to \"Notes/Cancer History\" above for complete History of present illness.     Mr Gabriel Cowan comes to clinic alone. He is doing well and reports that his knee pain is much better.  He continues to walk 5 miles every day and on alternate days he may do a long walk of up to 8.5 miles.  He does not report any signs or symptoms suggestive of long-term immunotherapy related side effects.  He read his CT scans online and wants to discuss them.     Review of Systems:   Review of Systems   Constitutional: Negative.  Negative for appetite change, chills, fatigue and fever.   HENT:  Negative.  Negative for hearing loss, mouth sores, sore throat, trouble swallowing and voice change.    Eyes: Negative.  Negative for eye problems and icterus.   Respiratory: Negative.  Negative for chest tightness, hemoptysis and shortness of breath.    Cardiovascular: Negative.  Negative for chest pain, leg swelling and palpitations.   Gastrointestinal: Negative.  Negative for abdominal pain, blood in stool, constipation, diarrhea, nausea and vomiting.   Endocrine: " Negative.  Negative for hot flashes.   Genitourinary: Negative.  Negative for difficulty urinating, dysuria, frequency, hematuria, nocturia and pelvic pain.    Musculoskeletal:  Negative for arthralgias, back pain, flank pain, gait problem, myalgias and neck pain.        Knee pain improved   Skin: Negative.  Negative for itching and rash.   Neurological: Negative.  Negative for dizziness, extremity weakness, gait problem, light-headedness, numbness and seizures.   Hematological: Negative.  Negative for adenopathy. Does not bruise/bleed easily.   Psychiatric/Behavioral: Negative.  Negative for confusion and depression. The patient is not nervous/anxious.          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)        FAMILY HISTORY  Family History   Problem Relation Name Age of Onset    No Known Problems Mother      No Known Problems Father      No Known Problems Sister      No Known Problems Brother         TOBACCO HISTORY  Tobacco Use: High Risk (11/7/2024)    Patient History     Smoking Tobacco Use: Some Days     Smokeless Tobacco Use: Never     Passive Exposure: Never       SOCIAL HISTORY  Social Connections: Not on file   Retired ER physician, , lives with his wife     Outpatient Medication Profile:  Current Outpatient Medications on File Prior to Visit   Medication Sig Dispense Refill    aspirin 81 mg EC tablet Take 1 tablet (81 mg) by mouth once daily. 90 tablet 3    atorvastatin (Lipitor) 80 mg tablet Take 1 tablet (80 mg) by mouth once daily. 90 tablet 3    ezetimibe (Zetia) 10 mg tablet Take 1 tablet (10 mg) by mouth once daily at bedtime. 90 tablet 3    lisinopril 20 mg tablet Take 1 tablet (20 mg) by mouth once daily. 90 tablet 3    metoprolol succinate XL (Toprol-XL) 100 mg 24 hr tablet Take 1 tablet (100 mg) by mouth once daily. DO  NOT CRUSH OR CHEW 90 tablet 3     No current facility-administered medications on file prior to visit.         Performance Status:  Asymptomatic     Vitals and Measurements:   /84 (BP Location: Left arm, Patient Position: Sitting)   Pulse 67   Temp 35.8 °C (96.4 °F) (Temporal)   Resp 16   Wt 72 kg (158 lb 11.2 oz)   SpO2 98%   BMI 27.24 kg/m²       Physical Exam:   Physical Exam  Constitutional:       General: He is not in acute distress.     Appearance: Normal appearance. He is not ill-appearing.   HENT:      Head: Normocephalic and atraumatic.      Nose: Nose normal.      Mouth/Throat:      Mouth: Mucous membranes are moist.      Pharynx: Oropharynx is clear.   Eyes:      Extraocular Movements: Extraocular movements intact.      Conjunctiva/sclera: Conjunctivae normal.      Pupils: Pupils are equal, round, and reactive to light.   Cardiovascular:      Rate and Rhythm: Normal rate and regular rhythm.      Pulses: Normal pulses.      Heart sounds: Normal heart sounds.   Pulmonary:      Effort: Pulmonary effort is normal.      Breath sounds: Normal breath sounds. No wheezing, rhonchi or rales.   Abdominal:      General: Abdomen is flat. Bowel sounds are normal.      Palpations: Abdomen is soft. There is no mass.      Tenderness: There is no abdominal tenderness. There is no rebound.   Musculoskeletal:         General: Normal range of motion.      Cervical back: Neck supple.   Skin:     General: Skin is warm and dry.      Findings: No bruising or erythema.      Comments: Well healed incision on back without nodules    Neurological:      General: No focal deficit present.      Mental Status: He is alert and oriented to person, place, and time.   Psychiatric:         Mood and Affect: Mood normal.         Behavior: Behavior normal.         Thought Content: Thought content normal.         Judgment: Judgment normal.      Lab Results:  I have reviewed these laboratory results:     Lab on 04/07/2025   Component  Date Value Ref Range Status    Glucose 04/07/2025 104 (H)  74 - 99 mg/dL Final    Sodium 04/07/2025 139  136 - 145 mmol/L Final    Potassium 04/07/2025 4.7  3.5 - 5.3 mmol/L Final    Chloride 04/07/2025 103  98 - 107 mmol/L Final    Bicarbonate 04/07/2025 26  21 - 32 mmol/L Final    Anion Gap 04/07/2025 15  10 - 20 mmol/L Final    Urea Nitrogen 04/07/2025 15  6 - 23 mg/dL Final    Creatinine 04/07/2025 1.06  0.50 - 1.30 mg/dL Final    eGFR 04/07/2025 73  >60 mL/min/1.73m*2 Final    Calcium 04/07/2025 9.3  8.6 - 10.3 mg/dL Final    Albumin 04/07/2025 4.4  3.4 - 5.0 g/dL Final    Alkaline Phosphatase 04/07/2025 64  33 - 136 U/L Final    Total Protein 04/07/2025 6.6  6.4 - 8.2 g/dL Final    AST 04/07/2025 24  9 - 39 U/L Final    Bilirubin, Total 04/07/2025 0.9  0.0 - 1.2 mg/dL Final    ALT 04/07/2025 23  10 - 52 U/L Final    WBC 04/07/2025 9.0  4.4 - 11.3 x10*3/uL Final    RBC 04/07/2025 4.94  4.50 - 5.90 x10*6/uL Final    Hemoglobin 04/07/2025 16.0  13.5 - 17.5 g/dL Final    Hematocrit 04/07/2025 47.8  41.0 - 52.0 % Final    MCV 04/07/2025 97  80 - 100 fL Final    MCH 04/07/2025 32.4  26.0 - 34.0 pg Final    MCHC 04/07/2025 33.5  32.0 - 36.0 g/dL Final    RDW 04/07/2025 13.8  11.5 - 14.5 % Final    Platelets 04/07/2025 172  150 - 450 x10*3/uL Final    Neutrophils % 04/07/2025 74.5  40.0 - 80.0 % Final    Immature Granulocytes %, Automated 04/07/2025 0.2  0.0 - 0.9 % Final    Lymphocytes % 04/07/2025 14.3  13.0 - 44.0 % Final    Monocytes % 04/07/2025 10.3  2.0 - 10.0 % Final    Eosinophils % 04/07/2025 0.3  0.0 - 6.0 % Final    Basophils % 04/07/2025 0.4  0.0 - 2.0 % Final    Neutrophils Absolute 04/07/2025 6.68 (H)  1.60 - 5.50 x10*3/uL Final    Immature Granulocytes Absolute, Au* 04/07/2025 0.02  0.00 - 0.50 x10*3/uL Final    Lymphocytes Absolute 04/07/2025 1.29  0.80 - 3.00 x10*3/uL Final    Monocytes Absolute 04/07/2025 0.93 (H)  0.05 - 0.80 x10*3/uL Final    Eosinophils Absolute 04/07/2025 0.03  0.00 - 0.40  x10*3/uL Final    Basophils Absolute 04/07/2025 0.04  0.00 - 0.10 x10*3/uL Final    LDH 04/07/2025 181  84 - 246 U/L Final        Radiology Result:  I have reviewed the latest Imaging in PACS and the findings are noted in this note. I discussed the results of the latest imaging with the patient. All previous imaging were reviewed at the time it was completed. Full records are available in the EMR for review as well.     CT chest abdomen pelvis w IV contrast    Result Date: 4/9/2025  Impression: 1. No definite metastatic disease in the chest, abdomen, or pelvis.   2. No pulmonary nodularity demonstrated   3. Chronic appearing changes as described above.       MACRO: None   Signed by: Martin Whiteside 4/9/2025 11:03 AM Dictation workstation:   [id number]      Pathology Results:  I have reviewed the full pathology report recorded in the EMR. The pertinent portions indicating diagnosis are listed here in the note. for details please refer to the full report recorded in the EMR.    I have reviewed the full pathology report recorded in the EMR. The pertinent portions indicating diagnosis are listed here in the note. for details please refer to the full report recorded in the EMR.     Dermatopathology [Jan 26 2023 4:25PM] ([id number])    Specimens: SKIN, L LATERAL BACK     Name JUNIE ROSA     Accession  #: [id number]   Pathologist: KENY LOPEZ MD   Date ofProcedure: 1/24/2023   Date Received: 1/25/2023   Date Reported 1/26/2023   Submitting Physician: BRANDO MOORE DO   Location: [facility] Other External #     FINAL  DIAGNOSIS   SKIN, L LATERAL BACK, SHAVE BIOPSY:   INVASIVE MALIGNANT MELANOMA, EXTENDING TO A DEPTH OF APPROXIMATELY 1.6 MM (SEE COMMENT):     Comment: The broad shave specimen is sectioned into five portions and reveals a broad severely  atypical compound melanocytic proliferation with areas of tumor regression. The invasive component extends focally to the deep margin at a peripheral margin. See  synoptic summary below.     Electronically Signed Out by KENY LOPEZ MD.     Addendum Diagnosis   TEST: BRAF Exon 15 Sequencing   SPECIMEN: FFPE, SKIN LEFT UPPER BACK, [id number] C64   DISEASE DIAGNOSIS: Melanoma   Estimated Tumor Content: 40%   COLLECTION  DATE: 3/6/2023   RECEIVED DATE: 3/20/2023   REPORT DATE: 03/24/2023    RESULT:   BRAF p.V600E ([id number])      INTERPRETATION AND POTENTIAL THERAPEUTIC OPTIONS:   BRAF p.V600E   BRAF V600E mutations are associated with sensitivity to BRAF and MEK   inhibitors.      Assessment and Plan:   Assessment/Plan   Mr. Gabriel Cowan is a 74 y.o. male with a diagnosis of melanoma IIIC. BRAF positive. Please see the evolution of the case listed above in the cancer history.      Today,  We reviewed the CT scan images and the report together.  There are some chronic changes which are reported on the CT scan from the continue to be stable.  There is no evidence of recurrence of melanoma.  He continues to follow with dermatologist.  He continues to have long walks and remains very active.  He is in the third year of his surveillance and at this point, I recommend to switch over to 6-month CT scans.     # Melanoma IIIC  - Continue surveillance CT scans.  Neck CT scan scheduled in October 2025 with labs  - Visit after completion of CT scans and labs     # CAD/HTN  - Follows with cardiology and his PCP.     I will be cutting back my skin cancer clinic in [facility].  I will be transferring the care of this patient to Dr. Janny Oliva, my partner in cutaneous oncology team who works out of [facility].  The patient is in agreement with this plan.     DISCLAIMER:   In preparing for this visit and writing this note, I reviewed all the previous electronic medical records (labs, imaging and medical charts) of the patient available in the physician portal. Significant findings which helped in decision making are recorded  in this chart.     The plan was discussed with the patient. We  gave him ample opportunities to ask questions. All questions were answered to his satisfaction and he verbalized understanding.      Christopher Iglesias MD    Division of Hematology and Oncology  [facility]    Co-Director Sarcoma and Cutaneous Oncology  [facility]    Phone: [phone]  Fax: [phone]

## 2025-05-01 ENCOUNTER — HOSPITAL ENCOUNTER (OUTPATIENT)
Dept: RADIOLOGY | Facility: HOSPITAL | Age: 75
Discharge: HOME | End: 2025-05-01
Payer: MEDICARE

## 2025-05-01 DIAGNOSIS — C77.9 MELANOMA METASTATIC TO LYMPH NODE (MULTI): ICD-10-CM

## 2025-05-01 PROCEDURE — 76882 US LMTD JT/FCL EVL NVASC XTR: CPT | Mod: LT

## 2025-05-07 DIAGNOSIS — C43.59 MALIGNANT MELANOMA OF SKIN OF BACK (MULTI): Primary | ICD-10-CM

## 2025-08-14 DIAGNOSIS — E78.5 DYSLIPIDEMIA: ICD-10-CM

## 2025-08-15 RX ORDER — EZETIMIBE 10 MG/1
10 TABLET ORAL NIGHTLY
Qty: 100 TABLET | Refills: 2 | Status: SHIPPED | OUTPATIENT
Start: 2025-08-15

## 2025-08-19 ENCOUNTER — APPOINTMENT (OUTPATIENT)
Dept: DERMATOLOGY | Facility: CLINIC | Age: 75
End: 2025-08-19
Payer: MEDICARE

## 2025-08-21 DIAGNOSIS — I10 ESSENTIAL (PRIMARY) HYPERTENSION: ICD-10-CM

## 2025-08-21 DIAGNOSIS — E78.5 HYPERLIPIDEMIA, UNSPECIFIED HYPERLIPIDEMIA TYPE: ICD-10-CM

## 2025-08-22 RX ORDER — LISINOPRIL 20 MG/1
20 TABLET ORAL DAILY
Qty: 100 TABLET | Refills: 2 | Status: SHIPPED | OUTPATIENT
Start: 2025-08-22

## 2025-08-22 RX ORDER — ATORVASTATIN CALCIUM 80 MG/1
80 TABLET, FILM COATED ORAL DAILY
Qty: 100 TABLET | Refills: 2 | Status: SHIPPED | OUTPATIENT
Start: 2025-08-22

## 2025-10-15 ENCOUNTER — APPOINTMENT (OUTPATIENT)
Dept: HEMATOLOGY/ONCOLOGY | Facility: CLINIC | Age: 75
End: 2025-10-15
Payer: MEDICARE

## 2026-01-23 ENCOUNTER — APPOINTMENT (OUTPATIENT)
Dept: DERMATOLOGY | Facility: CLINIC | Age: 76
End: 2026-01-23
Payer: MEDICARE